# Patient Record
Sex: FEMALE | Race: WHITE | Employment: OTHER | URBAN - METROPOLITAN AREA
[De-identification: names, ages, dates, MRNs, and addresses within clinical notes are randomized per-mention and may not be internally consistent; named-entity substitution may affect disease eponyms.]

---

## 2020-12-22 ENCOUNTER — HOSPITAL ENCOUNTER (OUTPATIENT)
Dept: PREADMISSION TESTING | Age: 70
Discharge: HOME | End: 2020-12-22
Payer: MEDICARE

## 2020-12-24 ENCOUNTER — OFFICE VISIT (OUTPATIENT)
Dept: PRIMARY CARE CLINIC | Age: 70
End: 2020-12-24
Payer: MEDICARE

## 2020-12-24 PROCEDURE — G8421 BMI NOT CALCULATED: HCPCS | Performed by: NURSE PRACTITIONER

## 2020-12-24 PROCEDURE — G8428 CUR MEDS NOT DOCUMENT: HCPCS | Performed by: NURSE PRACTITIONER

## 2020-12-24 PROCEDURE — 99211 OFF/OP EST MAY X REQ PHY/QHP: CPT | Performed by: NURSE PRACTITIONER

## 2020-12-24 NOTE — PROGRESS NOTES
Patient presented to [facility] drive up clinic for preop testing. Patient was swabbed and given information advising them to remain isolated until procedure date.

## 2020-12-25 LAB — SARS-COV-2: DETECTED

## 2020-12-26 NOTE — RESULT ENCOUNTER NOTE
LM to contact PCP. Your test came back detected/positive for Covid-19. What happens if I have a positive test?  If you have symptoms:  Isolate until all three of these things are true: 1) your symptoms are better, 2) it has been 10 days since you first felt sick, and 3) you have had no fever for at least 24 hours without using medicine that lowers fever. Drink plenty of fluids and eat when you can. You may take medicine for pain or fever if you need to. Rest as much as you can. If you do not have symptoms:  Stay home for 10 days after the date you were tested. If you develop symptoms during those 10 days, stay home until all three of these things are true: 1) your symptoms are better, 2) it has been 10 days since you first felt sick, and 3) you have had no fever for at least 24 hours without using medicine that lowers fever. Follow care instructions from your doctor or other healthcare provider. Seek emergency medical care immediately if you have trouble breathing, persistent pain or pressure in the chest, new confusion, inability to wake or stay awake, or bluish lips or face. Someone from the health department (case investigator or contact tracer) may reach out to you to check on your health and ask about other people you have been around or where you've spent time while you may have been able to spread COVID-19 to others. This person's role is strictly to map the virus to help identify people who may have been exposed to the virus and prevent its spread. The local health department will also provide guidance on how to stay safely at home to avoid spreading illness. Detected results can happen for months and you are not considered contagious. No retest is necessary.

## 2020-12-28 ENCOUNTER — HOSPITAL ENCOUNTER (OUTPATIENT)
Dept: PREADMISSION TESTING | Age: 70
Discharge: HOME | End: 2021-01-01
Payer: MEDICARE

## 2021-01-20 ENCOUNTER — HOSPITAL ENCOUNTER (OUTPATIENT)
Dept: INTERVENTIONAL RADIOLOGY/VASCULAR | Age: 71
Discharge: HOME | End: 2021-01-20
Payer: MEDICARE

## 2021-03-10 ENCOUNTER — HOSPITAL ENCOUNTER (OUTPATIENT)
Dept: PREADMISSION TESTING | Age: 71
Discharge: HOME | End: 2021-03-14
Payer: MEDICARE

## 2021-03-10 DIAGNOSIS — Z01.818 ENCOUNTER FOR PREADMISSION TESTING: Primary | ICD-10-CM

## 2021-03-10 LAB
ABO/RH: NORMAL
ANION GAP SERPL CALCULATED.3IONS-SCNC: 8 MMOL/L (ref 3–16)
ANTIBODY SCREEN: NORMAL
APTT: 34.3 SEC (ref 24.2–36.2)
BUN BLDV-MCNC: 14 MG/DL (ref 7–20)
CALCIUM SERPL-MCNC: 9.4 MG/DL (ref 8.3–10.6)
CHLORIDE BLD-SCNC: 103 MMOL/L (ref 99–110)
CO2: 29 MMOL/L (ref 21–32)
CREAT SERPL-MCNC: 0.7 MG/DL (ref 0.6–1.2)
EKG ATRIAL RATE: 71 BPM
EKG DIAGNOSIS: NORMAL
EKG P AXIS: 66 DEGREES
EKG P-R INTERVAL: 144 MS
EKG Q-T INTERVAL: 360 MS
EKG QRS DURATION: 74 MS
EKG QTC CALCULATION (BAZETT): 391 MS
EKG R AXIS: 9 DEGREES
EKG T AXIS: 34 DEGREES
EKG VENTRICULAR RATE: 71 BPM
GFR AFRICAN AMERICAN: >60
GFR NON-AFRICAN AMERICAN: >60
GLUCOSE BLD-MCNC: 93 MG/DL (ref 70–99)
HCT VFR BLD CALC: 40.5 % (ref 36–48)
HEMOGLOBIN: 13.7 G/DL (ref 12–16)
INR BLD: 1.07 (ref 0.86–1.14)
MCH RBC QN AUTO: 30.1 PG (ref 26–34)
MCHC RBC AUTO-ENTMCNC: 33.9 G/DL (ref 31–36)
MCV RBC AUTO: 88.7 FL (ref 80–100)
PDW BLD-RTO: 14.2 % (ref 12.4–15.4)
PLATELET # BLD: 281 K/UL (ref 135–450)
PMV BLD AUTO: 7.6 FL (ref 5–10.5)
POTASSIUM SERPL-SCNC: 4.2 MMOL/L (ref 3.5–5.1)
PROTHROMBIN TIME: 12.4 SEC (ref 10–13.2)
RBC # BLD: 4.56 M/UL (ref 4–5.2)
SODIUM BLD-SCNC: 140 MMOL/L (ref 136–145)
WBC # BLD: 8.1 K/UL (ref 4–11)

## 2021-03-10 PROCEDURE — 86900 BLOOD TYPING SEROLOGIC ABO: CPT

## 2021-03-10 PROCEDURE — 86850 RBC ANTIBODY SCREEN: CPT

## 2021-03-10 PROCEDURE — 86901 BLOOD TYPING SEROLOGIC RH(D): CPT

## 2021-03-10 PROCEDURE — 93010 ELECTROCARDIOGRAM REPORT: CPT | Performed by: INTERNAL MEDICINE

## 2021-03-10 PROCEDURE — 85730 THROMBOPLASTIN TIME PARTIAL: CPT

## 2021-03-10 PROCEDURE — 93005 ELECTROCARDIOGRAM TRACING: CPT | Performed by: UROLOGY

## 2021-03-10 PROCEDURE — 85610 PROTHROMBIN TIME: CPT

## 2021-03-10 PROCEDURE — 80048 BASIC METABOLIC PNL TOTAL CA: CPT

## 2021-03-10 PROCEDURE — 85027 COMPLETE CBC AUTOMATED: CPT

## 2021-03-12 ENCOUNTER — OFFICE VISIT (OUTPATIENT)
Dept: PRIMARY CARE CLINIC | Age: 71
End: 2021-03-12
Payer: MEDICARE

## 2021-03-12 DIAGNOSIS — Z20.828 EXPOSURE TO SARS-ASSOCIATED CORONAVIRUS: Primary | ICD-10-CM

## 2021-03-12 LAB — SARS-COV-2: NOT DETECTED

## 2021-03-12 PROCEDURE — 99211 OFF/OP EST MAY X REQ PHY/QHP: CPT | Performed by: NURSE PRACTITIONER

## 2021-03-12 NOTE — PROGRESS NOTES
Wild Grossman received a viral test for COVID-19. They were educated on isolation and quarantine as appropriate. For any symptoms, they were directed to seek care from their PCP, given contact information to establish with a doctor, directed to an urgent care or the emergency room.

## 2021-03-15 RX ORDER — CHLORAL HYDRATE 500 MG
1 CAPSULE ORAL DAILY
Status: ON HOLD | COMMUNITY
End: 2021-03-18 | Stop reason: HOSPADM

## 2021-03-15 RX ORDER — VITAMIN E 268 MG
400 CAPSULE ORAL DAILY
Status: ON HOLD | COMMUNITY
End: 2021-03-18 | Stop reason: HOSPADM

## 2021-03-15 RX ORDER — MULTIVIT WITH MINERALS/LUTEIN
1000 TABLET ORAL DAILY
Status: ON HOLD | COMMUNITY
End: 2021-03-18 | Stop reason: HOSPADM

## 2021-03-15 SDOH — HEALTH STABILITY: MENTAL HEALTH: HOW OFTEN DO YOU HAVE A DRINK CONTAINING ALCOHOL?: NEVER

## 2021-03-15 NOTE — PROGRESS NOTES
[address] time__0630__________        Surgery time___0930_________    Take the following medications with a sip of water: Follow your MD/Surgeons pre-procedure instructions regarding your medications    Do not eat or drink anything after 12:00 midnight prior to your surgery. This includes water chewing gum, mints and ice chips. You may brush your teeth and gargle the morning of your surgery, but do not swallow the water     Please see your family doctor/pediatrician for a history and physical and/or concerning medications. Bring any test results/reports from your physicians office. If you are under the care of a heart doctor or specialist doctor, please be aware that you may be asked to them for clearance    You may be asked to stop blood thinners such as Coumadin, Plavix, Fragmin, Lovenox, etc., or any anti-inflammatories such as:  Aspirin, Ibuprofen, Advil, Naproxen prior to your surgery. We also ask that you stop any OTC medications such as fish oil, vitamin E, glucosamine, garlic, Multivitamins, COQ 10, etc. MAY TAKE TYLENOL    We ask that you do not smoke 24 hours prior to surgery  We ask that you do not  drink any alcoholic beverages 24 hours prior to surgery     You must make arrangements for a responsible adult to take you home after your surgery. For your safety you will not be allowed to leave alone or drive yourself home. Your surgery will be cancelled if you do not have a ride home. Also for your safety, it is strongly suggested that someone stay with you the first 24 hours after your surgery. A parent or legal guardian must accompany a child scheduled for surgery and plan to stay at the hospital until the child is discharged. Please do not bring other children with you. For your comfort, please wear simple loose fitting clothing to the hospital.  Please do not bring valuables.     Do not wear any make-up or nail polish on your fingers or toes      For your safety, please do not wear any jewelry or body piercing's on the day of surgery. All jewelry must be removed. If you have dentures, they will be removed before going to operating room. For your convenience, we will provide you with a container. If you wear contact lenses or glasses, they will be removed, please bring a case for them. If you have a living will and a durable power of  for healthcare, please bring in a copy. As part of our patient safety program to minimize surgical site infections, we ask you to do the following:    · Please notify your surgeon if you develop any illness between         now and the  day of your surgery. · This includes a cough, cold, fever, sore throat, nausea,         or vomiting, and diarrhea, etc.  ·  Please notify your surgeon if you experience dizziness, shortness         of breath or blurred vision between now and the time of your surgery. Do not shave your operative site 96 hours prior to surgery. For face and neck surgery, men may use an electric razor 48 hours   prior to surgery. You may shower the night before surgery or the morning of   your surgery with an antibacterial soap. You will need to bring a photo ID and insurance card    [facility] has an onsite pharmacy, would you like to utilize our pharmacy     If you will be staying overnight and use a C-pap machine, please bring   your C-pap to hospital     Our goal is to provide you with excellent care, therefore, visitors will be limited to two(2) in the room at a time so that we may focus on providing this care for you. Please contact pre-admission testing if you have any further questions.                  [facility] phone number:  [phone] PAT fax number:  [phone]  Please note these are generalized instructions for all surgical cases, you may be provided with more specific instructions according to your surgery. Remember. Laney Ramos Safety First! Call before you Fall Remember

## 2021-03-16 ENCOUNTER — ANESTHESIA EVENT (OUTPATIENT)
Dept: OPERATING ROOM | Age: 71
End: 2021-03-16
Payer: MEDICARE

## 2021-03-17 ENCOUNTER — HOSPITAL ENCOUNTER (OUTPATIENT)
Age: 71
Setting detail: OBSERVATION
Discharge: HOME | End: 2021-03-18
Attending: UROLOGY | Admitting: UROLOGY
Payer: MEDICARE

## 2021-03-17 ENCOUNTER — ANESTHESIA (OUTPATIENT)
Dept: OPERATING ROOM | Age: 71
End: 2021-03-17
Payer: MEDICARE

## 2021-03-17 ENCOUNTER — APPOINTMENT (OUTPATIENT)
Dept: GENERAL RADIOLOGY | Age: 71
End: 2021-03-17
Attending: UROLOGY
Payer: MEDICARE

## 2021-03-17 ENCOUNTER — HOSPITAL ENCOUNTER (OUTPATIENT)
Dept: INTERVENTIONAL RADIOLOGY/VASCULAR | Age: 71
Discharge: HOME | End: 2021-03-17
Payer: MEDICARE

## 2021-03-17 VITALS
DIASTOLIC BLOOD PRESSURE: 71 MMHG | TEMPERATURE: 95.5 F | SYSTOLIC BLOOD PRESSURE: 144 MMHG | RESPIRATION RATE: 16 BRPM | OXYGEN SATURATION: 100 %

## 2021-03-17 DIAGNOSIS — N20.0 CALCULUS OF KIDNEY: ICD-10-CM

## 2021-03-17 LAB
ABO/RH: NORMAL
ANTIBODY SCREEN: NORMAL

## 2021-03-17 PROCEDURE — C2617 STENT, NON-COR, TEM W/O DEL: HCPCS | Performed by: UROLOGY

## 2021-03-17 PROCEDURE — 6360000004 HC RX CONTRAST MEDICATION: Performed by: UROLOGY

## 2021-03-17 PROCEDURE — 3209999900 FLUORO FOR SURGICAL PROCEDURES

## 2021-03-17 PROCEDURE — 2580000003 HC RX 258: Performed by: UROLOGY

## 2021-03-17 PROCEDURE — 3700000001 HC ADD 15 MINUTES (ANESTHESIA): Performed by: UROLOGY

## 2021-03-17 PROCEDURE — 6370000000 HC RX 637 (ALT 250 FOR IP): Performed by: UROLOGY

## 2021-03-17 PROCEDURE — 99153 MOD SED SAME PHYS/QHP EA: CPT

## 2021-03-17 PROCEDURE — 96365 THER/PROPH/DIAG IV INF INIT: CPT

## 2021-03-17 PROCEDURE — 2720000010 HC SURG SUPPLY STERILE: Performed by: UROLOGY

## 2021-03-17 PROCEDURE — 2709999900 HC NON-CHARGEABLE SUPPLY: Performed by: UROLOGY

## 2021-03-17 PROCEDURE — 82365 CALCULUS SPECTROSCOPY: CPT

## 2021-03-17 PROCEDURE — 2500000003 HC RX 250 WO HCPCS

## 2021-03-17 PROCEDURE — C1769 GUIDE WIRE: HCPCS | Performed by: UROLOGY

## 2021-03-17 PROCEDURE — 1200000000 HC SEMI PRIVATE

## 2021-03-17 PROCEDURE — 53899 UNLISTED PX URINARY SYSTEM: CPT

## 2021-03-17 PROCEDURE — C1726 CATH, BAL DIL, NON-VASCULAR: HCPCS | Performed by: UROLOGY

## 2021-03-17 PROCEDURE — C1894 INTRO/SHEATH, NON-LASER: HCPCS | Performed by: UROLOGY

## 2021-03-17 PROCEDURE — 86900 BLOOD TYPING SEROLOGIC ABO: CPT

## 2021-03-17 PROCEDURE — 50433 PLMT NEPHROURETERAL CATHETER: CPT

## 2021-03-17 PROCEDURE — 2580000003 HC RX 258: Performed by: ANESTHESIOLOGY

## 2021-03-17 PROCEDURE — 6360000002 HC RX W HCPCS

## 2021-03-17 PROCEDURE — 94640 AIRWAY INHALATION TREATMENT: CPT

## 2021-03-17 PROCEDURE — 74018 RADEX ABDOMEN 1 VIEW: CPT

## 2021-03-17 PROCEDURE — 88300 SURGICAL PATH GROSS: CPT

## 2021-03-17 PROCEDURE — G0378 HOSPITAL OBSERVATION PER HR: HCPCS

## 2021-03-17 PROCEDURE — 99152 MOD SED SAME PHYS/QHP 5/>YRS: CPT

## 2021-03-17 PROCEDURE — 3700000000 HC ANESTHESIA ATTENDED CARE: Performed by: UROLOGY

## 2021-03-17 PROCEDURE — 2709999900 IR GUIDED URETERAL STENT PLACE WO NEPHRO CATH NEW ACCESS

## 2021-03-17 PROCEDURE — 6360000002 HC RX W HCPCS: Performed by: UROLOGY

## 2021-03-17 PROCEDURE — 7100000000 HC PACU RECOVERY - FIRST 15 MIN: Performed by: UROLOGY

## 2021-03-17 PROCEDURE — 3600000014 HC SURGERY LEVEL 4 ADDTL 15MIN: Performed by: UROLOGY

## 2021-03-17 PROCEDURE — 3600000004 HC SURGERY LEVEL 4 BASE: Performed by: UROLOGY

## 2021-03-17 PROCEDURE — 86901 BLOOD TYPING SEROLOGIC RH(D): CPT

## 2021-03-17 PROCEDURE — 86850 RBC ANTIBODY SCREEN: CPT

## 2021-03-17 PROCEDURE — 7100000001 HC PACU RECOVERY - ADDTL 15 MIN: Performed by: UROLOGY

## 2021-03-17 PROCEDURE — 6360000002 HC RX W HCPCS: Performed by: RADIOLOGY

## 2021-03-17 DEVICE — URETERAL STENT
Type: IMPLANTABLE DEVICE | Site: KIDNEY | Status: FUNCTIONAL
Brand: CONTOUR™

## 2021-03-17 RX ORDER — MAGNESIUM HYDROXIDE 1200 MG/15ML
LIQUID ORAL CONTINUOUS PRN
Status: COMPLETED | OUTPATIENT
Start: 2021-03-17 | End: 2021-03-17

## 2021-03-17 RX ORDER — KETAMINE HCL IN NACL, ISO-OSM 100MG/10ML
SYRINGE (ML) INJECTION PRN
Status: DISCONTINUED | OUTPATIENT
Start: 2021-03-17 | End: 2021-03-17 | Stop reason: SDUPTHER

## 2021-03-17 RX ORDER — OXYCODONE HYDROCHLORIDE 5 MG/1
5 TABLET ORAL EVERY 4 HOURS PRN
Status: DISCONTINUED | OUTPATIENT
Start: 2021-03-17 | End: 2021-03-18 | Stop reason: HOSPADM

## 2021-03-17 RX ORDER — DIPHENHYDRAMINE HYDROCHLORIDE 50 MG/ML
INJECTION INTRAMUSCULAR; INTRAVENOUS DAILY PRN
Status: COMPLETED | OUTPATIENT
Start: 2021-03-17 | End: 2021-03-17

## 2021-03-17 RX ORDER — ONDANSETRON 4 MG/1
4 TABLET, ORALLY DISINTEGRATING ORAL EVERY 8 HOURS PRN
Status: DISCONTINUED | OUTPATIENT
Start: 2021-03-17 | End: 2021-03-18 | Stop reason: HOSPADM

## 2021-03-17 RX ORDER — SODIUM CHLORIDE 0.9 % (FLUSH) 0.9 %
10 SYRINGE (ML) INJECTION EVERY 12 HOURS SCHEDULED
Status: DISCONTINUED | OUTPATIENT
Start: 2021-03-17 | End: 2021-03-17 | Stop reason: HOSPADM

## 2021-03-17 RX ORDER — LABETALOL HYDROCHLORIDE 5 MG/ML
5 INJECTION, SOLUTION INTRAVENOUS EVERY 10 MIN PRN
Status: DISCONTINUED | OUTPATIENT
Start: 2021-03-17 | End: 2021-03-17 | Stop reason: HOSPADM

## 2021-03-17 RX ORDER — ROCURONIUM BROMIDE 10 MG/ML
INJECTION, SOLUTION INTRAVENOUS PRN
Status: DISCONTINUED | OUTPATIENT
Start: 2021-03-17 | End: 2021-03-17 | Stop reason: SDUPTHER

## 2021-03-17 RX ORDER — PROMETHAZINE HYDROCHLORIDE 25 MG/ML
6.25 INJECTION, SOLUTION INTRAMUSCULAR; INTRAVENOUS
Status: DISCONTINUED | OUTPATIENT
Start: 2021-03-17 | End: 2021-03-17 | Stop reason: HOSPADM

## 2021-03-17 RX ORDER — MIDAZOLAM HYDROCHLORIDE 1 MG/ML
INJECTION INTRAMUSCULAR; INTRAVENOUS DAILY PRN
Status: COMPLETED | OUTPATIENT
Start: 2021-03-17 | End: 2021-03-17

## 2021-03-17 RX ORDER — FENTANYL CITRATE 50 UG/ML
INJECTION, SOLUTION INTRAMUSCULAR; INTRAVENOUS PRN
Status: DISCONTINUED | OUTPATIENT
Start: 2021-03-17 | End: 2021-03-17 | Stop reason: SDUPTHER

## 2021-03-17 RX ORDER — SODIUM CHLORIDE 0.9 % (FLUSH) 0.9 %
10 SYRINGE (ML) INJECTION PRN
Status: DISCONTINUED | OUTPATIENT
Start: 2021-03-17 | End: 2021-03-17 | Stop reason: HOSPADM

## 2021-03-17 RX ORDER — SODIUM CHLORIDE 0.9 % (FLUSH) 0.9 %
10 SYRINGE (ML) INJECTION PRN
Status: DISCONTINUED | OUTPATIENT
Start: 2021-03-17 | End: 2021-03-18 | Stop reason: HOSPADM

## 2021-03-17 RX ORDER — FENTANYL CITRATE 50 UG/ML
INJECTION, SOLUTION INTRAMUSCULAR; INTRAVENOUS DAILY PRN
Status: COMPLETED | OUTPATIENT
Start: 2021-03-17 | End: 2021-03-17

## 2021-03-17 RX ORDER — ONDANSETRON 2 MG/ML
4 INJECTION INTRAMUSCULAR; INTRAVENOUS EVERY 6 HOURS PRN
Status: DISCONTINUED | OUTPATIENT
Start: 2021-03-17 | End: 2021-03-18 | Stop reason: HOSPADM

## 2021-03-17 RX ORDER — PROPOFOL 10 MG/ML
INJECTION, EMULSION INTRAVENOUS PRN
Status: DISCONTINUED | OUTPATIENT
Start: 2021-03-17 | End: 2021-03-17 | Stop reason: SDUPTHER

## 2021-03-17 RX ORDER — SODIUM CHLORIDE 9 MG/ML
INJECTION, SOLUTION INTRAVENOUS CONTINUOUS
Status: DISCONTINUED | OUTPATIENT
Start: 2021-03-17 | End: 2021-03-18 | Stop reason: HOSPADM

## 2021-03-17 RX ORDER — LIDOCAINE HYDROCHLORIDE 20 MG/ML
INJECTION, SOLUTION EPIDURAL; INFILTRATION; INTRACAUDAL; PERINEURAL PRN
Status: DISCONTINUED | OUTPATIENT
Start: 2021-03-17 | End: 2021-03-17 | Stop reason: SDUPTHER

## 2021-03-17 RX ORDER — GLYCOPYRROLATE 0.2 MG/ML
INJECTION INTRAMUSCULAR; INTRAVENOUS PRN
Status: DISCONTINUED | OUTPATIENT
Start: 2021-03-17 | End: 2021-03-17 | Stop reason: SDUPTHER

## 2021-03-17 RX ORDER — PHENYLEPHRINE HCL IN 0.9% NACL 1 MG/10 ML
SYRINGE (ML) INTRAVENOUS PRN
Status: DISCONTINUED | OUTPATIENT
Start: 2021-03-17 | End: 2021-03-17 | Stop reason: SDUPTHER

## 2021-03-17 RX ORDER — SODIUM CHLORIDE 9 MG/ML
INJECTION, SOLUTION INTRAVENOUS CONTINUOUS
Status: DISCONTINUED | OUTPATIENT
Start: 2021-03-17 | End: 2021-03-17

## 2021-03-17 RX ORDER — SODIUM CHLORIDE 0.9 % (FLUSH) 0.9 %
10 SYRINGE (ML) INJECTION EVERY 12 HOURS SCHEDULED
Status: DISCONTINUED | OUTPATIENT
Start: 2021-03-17 | End: 2021-03-18 | Stop reason: HOSPADM

## 2021-03-17 RX ORDER — FENTANYL CITRATE 50 UG/ML
25 INJECTION, SOLUTION INTRAMUSCULAR; INTRAVENOUS EVERY 5 MIN PRN
Status: DISCONTINUED | OUTPATIENT
Start: 2021-03-17 | End: 2021-03-17 | Stop reason: HOSPADM

## 2021-03-17 RX ORDER — BUDESONIDE AND FORMOTEROL FUMARATE DIHYDRATE 80; 4.5 UG/1; UG/1
2 AEROSOL RESPIRATORY (INHALATION) 2 TIMES DAILY
Status: DISCONTINUED | OUTPATIENT
Start: 2021-03-17 | End: 2021-03-18 | Stop reason: HOSPADM

## 2021-03-17 RX ORDER — DEXAMETHASONE SODIUM PHOSPHATE 4 MG/ML
INJECTION, SOLUTION INTRA-ARTICULAR; INTRALESIONAL; INTRAMUSCULAR; INTRAVENOUS; SOFT TISSUE PRN
Status: DISCONTINUED | OUTPATIENT
Start: 2021-03-17 | End: 2021-03-17 | Stop reason: SDUPTHER

## 2021-03-17 RX ADMIN — Medication 10 MG: at 10:10

## 2021-03-17 RX ADMIN — SUGAMMADEX 100 MG: 100 INJECTION, SOLUTION INTRAVENOUS at 10:57

## 2021-03-17 RX ADMIN — SODIUM CHLORIDE: 9 INJECTION, SOLUTION INTRAVENOUS at 10:51

## 2021-03-17 RX ADMIN — CEFAZOLIN SODIUM 2000 MG: 10 INJECTION, POWDER, FOR SOLUTION INTRAVENOUS at 15:24

## 2021-03-17 RX ADMIN — ROCURONIUM BROMIDE 40 MG: 10 INJECTION INTRAVENOUS at 09:52

## 2021-03-17 RX ADMIN — CEFTRIAXONE 1000 MG: 1 INJECTION, POWDER, FOR SOLUTION INTRAMUSCULAR; INTRAVENOUS at 07:48

## 2021-03-17 RX ADMIN — SODIUM CHLORIDE, PRESERVATIVE FREE 10 ML: 5 INJECTION INTRAVENOUS at 20:14

## 2021-03-17 RX ADMIN — ROCURONIUM BROMIDE 10 MG: 10 INJECTION INTRAVENOUS at 09:51

## 2021-03-17 RX ADMIN — FENTANYL CITRATE 50 MCG: 50 INJECTION INTRAMUSCULAR; INTRAVENOUS at 08:29

## 2021-03-17 RX ADMIN — SODIUM CHLORIDE: 9 INJECTION, SOLUTION INTRAVENOUS at 13:08

## 2021-03-17 RX ADMIN — LIDOCAINE HYDROCHLORIDE 100 MG: 20 INJECTION, SOLUTION EPIDURAL; INFILTRATION; INTRACAUDAL; PERINEURAL at 09:51

## 2021-03-17 RX ADMIN — PROPOFOL 200 MG: 10 INJECTION, EMULSION INTRAVENOUS at 09:51

## 2021-03-17 RX ADMIN — Medication 100 MCG: at 11:03

## 2021-03-17 RX ADMIN — Medication 100 MCG: at 10:04

## 2021-03-17 RX ADMIN — GLYCOPYRROLATE 0.2 MG: 0.2 INJECTION, SOLUTION INTRAMUSCULAR; INTRAVENOUS at 09:45

## 2021-03-17 RX ADMIN — IOPAMIDOL 15 ML: 612 INJECTION, SOLUTION INTRATHECAL at 09:05

## 2021-03-17 RX ADMIN — SODIUM CHLORIDE: 9 INJECTION, SOLUTION INTRAVENOUS at 07:17

## 2021-03-17 RX ADMIN — SODIUM CHLORIDE: 9 INJECTION, SOLUTION INTRAVENOUS at 20:12

## 2021-03-17 RX ADMIN — Medication 2 PUFF: at 22:08

## 2021-03-17 RX ADMIN — DEXAMETHASONE SODIUM PHOSPHATE 10 MG: 4 INJECTION, SOLUTION INTRAMUSCULAR; INTRAVENOUS at 10:00

## 2021-03-17 RX ADMIN — FENTANYL CITRATE 50 MCG: 50 INJECTION INTRAMUSCULAR; INTRAVENOUS at 09:50

## 2021-03-17 RX ADMIN — HYDROMORPHONE HYDROCHLORIDE 0.5 MG: 1 INJECTION, SOLUTION INTRAMUSCULAR; INTRAVENOUS; SUBCUTANEOUS at 20:07

## 2021-03-17 RX ADMIN — MIDAZOLAM 1 MG: 1 INJECTION INTRAMUSCULAR; INTRAVENOUS at 08:12

## 2021-03-17 RX ADMIN — FENTANYL CITRATE 50 MCG: 50 INJECTION INTRAMUSCULAR; INTRAVENOUS at 08:12

## 2021-03-17 RX ADMIN — DIPHENHYDRAMINE HYDROCHLORIDE 25 MG: 50 INJECTION, SOLUTION INTRAMUSCULAR; INTRAVENOUS at 08:27

## 2021-03-17 RX ADMIN — MIDAZOLAM 1 MG: 1 INJECTION INTRAMUSCULAR; INTRAVENOUS at 08:30

## 2021-03-17 RX ADMIN — Medication 10 MG: at 10:46

## 2021-03-17 RX ADMIN — Medication 50 MCG: at 10:03

## 2021-03-17 RX ADMIN — SUGAMMADEX 100 MG: 100 INJECTION, SOLUTION INTRAVENOUS at 11:20

## 2021-03-17 RX ADMIN — DIPHENHYDRAMINE HYDROCHLORIDE 25 MG: 50 INJECTION, SOLUTION INTRAMUSCULAR; INTRAVENOUS at 08:08

## 2021-03-17 RX ADMIN — Medication 10 MG: at 10:34

## 2021-03-17 ASSESSMENT — PULMONARY FUNCTION TESTS
PIF_VALUE: 19
PIF_VALUE: 18
PIF_VALUE: 20
PIF_VALUE: 1
PIF_VALUE: 4
PIF_VALUE: 18
PIF_VALUE: 2
PIF_VALUE: 0
PIF_VALUE: 2
PIF_VALUE: 18
PIF_VALUE: 19
PIF_VALUE: 18
PIF_VALUE: 18
PIF_VALUE: 11
PIF_VALUE: 19
PIF_VALUE: 6
PIF_VALUE: 18
PIF_VALUE: 17
PIF_VALUE: 3
PIF_VALUE: 19
PIF_VALUE: 18
PIF_VALUE: 18
PIF_VALUE: 0
PIF_VALUE: 19
PIF_VALUE: 18
PIF_VALUE: 17
PIF_VALUE: 18
PIF_VALUE: 17
PIF_VALUE: 20
PIF_VALUE: 17
PIF_VALUE: 17
PIF_VALUE: 16
PIF_VALUE: 17
PIF_VALUE: 18
PIF_VALUE: 18
PIF_VALUE: 19
PIF_VALUE: 6
PIF_VALUE: 18
PIF_VALUE: 17
PIF_VALUE: 6
PIF_VALUE: 17
PIF_VALUE: 5
PIF_VALUE: 18
PIF_VALUE: 22
PIF_VALUE: 19
PIF_VALUE: 19
PIF_VALUE: 18
PIF_VALUE: 19
PIF_VALUE: 17
PIF_VALUE: 19
PIF_VALUE: 17
PIF_VALUE: 0
PIF_VALUE: 18
PIF_VALUE: 18
PIF_VALUE: 19
PIF_VALUE: 17
PIF_VALUE: 18

## 2021-03-17 ASSESSMENT — PAIN SCALES - GENERAL
PAINLEVEL_OUTOF10: 0
PAINLEVEL_OUTOF10: 3
PAINLEVEL_OUTOF10: 7

## 2021-03-17 ASSESSMENT — PAIN - FUNCTIONAL ASSESSMENT
PAIN_FUNCTIONAL_ASSESSMENT: PREVENTS OR INTERFERES SOME ACTIVE ACTIVITIES AND ADLS
PAIN_FUNCTIONAL_ASSESSMENT: 0-10

## 2021-03-17 ASSESSMENT — PAIN DESCRIPTION - LOCATION: LOCATION: FLANK

## 2021-03-17 ASSESSMENT — PAIN DESCRIPTION - ONSET: ONSET: ON-GOING

## 2021-03-17 NOTE — PRE SEDATION
Sedation Pre-Procedure Note    Patient Name: Mary Garcias   YOB: 1950  Room/Bed: Room/bed info not found  Medical Record Number: [id number]  Date: 3/17/2021   Time: 7:52 AM       Indication:  Right perc neph prior to nephrolithotomy    Consent: I have discussed with the patient and/or the patient representative the indication, alternatives, and the possible risks and/or complications of the planned procedure and the anesthesia methods. The patient and/or patient representative appear to understand and agree to proceed. Vital Signs: There were no vitals filed for this visit. Past Medical History:   has a past medical history of Asthma and Kidney stones. Past Surgical History:   has a past surgical history that includes joint replacement (Left); Femur Surgery (Right); Elbow fracture surgery (Right); fracture surgery; and Hardware Removal.    Medications:   Scheduled Meds:   Continuous Infusions:   PRN Meds:   Home Meds:   Prior to Admission medications    Medication Sig Start Date End Date Taking? Authorizing Provider   fluticasone-salmeterol (ADVAIR) 100-50 MCG/DOSE diskus inhaler Inhale 1 puff into the lungs 2 times daily 1/5/21   Historical Provider, MD   VITAMIN D, CHOLECALCIFEROL, PO Take 15,000 Units by mouth daily    Historical Provider, MD   Ascorbic Acid (VITAMIN C) 1000 MG tablet Take 1,000 mg by mouth daily    Historical Provider, MD   [place]-3 1000 MG CAPS Take 1 capsule by mouth daily    Historical Provider, MD   vitamin E 400 UNIT capsule Take 400 Units by mouth daily    Historical Provider, MD     Coumadin Use Last 7 Days:  no  Antiplatelet drug therapy use last 7 days: no  Other anticoagulant use last 7 days: no  Additional Medication Information:  na      Pre-Sedation Documentation and Exam:   I have reviewed the patient's history and review of systems.     Mallampati Airway Assessment:  Mallampati Class II - (soft palate, fauces & uvula are visible)    Prior History of Anesthesia Complications:   none    ASA Classification:  Class 2 - A normal healthy patient with mild systemic disease    Sedation/ Anesthesia Plan:   intravenous sedation    Medications Planned:   midazolam (Versed) intravenously and fentanyl intravenously    Patient is an appropriate candidate for plan of sedation: yes    Electronically signed by Fior Doran MD on 3/17/2021 at 7:52 AM

## 2021-03-17 NOTE — PROGRESS NOTES
4 Eyes Skin Assessment     NAME:  Bryson King  YOB: 1950  MEDICAL RECORD NUMBER:  [id number]    The patient is being assess for  Admission    I agree that 2 RN's have performed a thorough Head to Toe Skin Assessment on the patient. ALL assessment sites listed below have been assessed. Areas assessed by both nurses:    Head, Face, Ears, Shoulders, Back, Chest, Arms, Elbows, Hands, Sacrum. Buttock, Coccyx, Ischium and Legs. Feet and Heels        Does the Patient have a Wound? Yes wound(s) were present on assessment.  LDA wound assessment was Initiated and completed        Todd Prevention initiated:  No   Wound Care Orders initiated:  No    Pressure Injury (Stage 3,4, Unstageable, DTI, NWPT, and Complex wounds) if present place consult order under [de-identified] No    New and Established Ostomies if present place consult order under : No      Nurse 1 eSignature: Electronically signed by Jenaro Segundo RN on 3/17/21 at 1:26 PM EDT    **SHARE this note so that the co-signing nurse is able to place an eSignature**    Nurse 2 eSignature: Electronically signed by Neo Smith RN on 3/17/21 at 1:27 PM EDT

## 2021-03-17 NOTE — BRIEF OP NOTE
Brief Operative Note      Patient: Nereida Holbrook MRN: [id number]     YOB: 1950  Age: 65 y.o. Sex: female        DATE OF PROCEDURE: 3/17/2021     PROCEDURE PERFORMED: Right perc neph    SURGEON: Rajeev Palacio MD    ANESTHESIA: Fentanyl, Versed    Estimated Blood Loss:none    Complications: None. Device implanted: Access catheter through upper pole to urinary bladder.            Specimen: none    Electronically signed by Rajeev Palacio MD on 3/17/2021 at 9:07 AM

## 2021-03-17 NOTE — H&P
Date of Surgery Update:  Giulia Rueda was seen, history and physical examination reviewed, and the patient was re-evalutate by me today. There have been no significant clinical changes since the completion of the previous history and physical. The surgical site was confirmed by the patient and me. The risk, benefits, and alternatives of the proposed procedure have been explained to the patient (or appropriate guardian) and understanding verbalized. All questions answered. Patient wishes to proceed.     Electronically signed by: Shauna Workman DO,3/17/2021,7:50 AM

## 2021-03-17 NOTE — PROGRESS NOTES
Back to room. CAll light within reach. No needs at this time. Patient states no one here with her currently.

## 2021-03-17 NOTE — ANESTHESIA POSTPROCEDURE EVALUATION
Department of Anesthesiology  Postprocedure Note    Patient: Manuela Wick  MRN: [id number]  YOB: 1950  Date of evaluation: 3/17/2021  Time:  2:29 PM     Procedure Summary     Date: 03/17/21 Room / Location: [address]    Anesthesia Start: 9848 Anesthesia Stop: 6995    Procedure: RIGHT PERCUTANEOUS NEPHROLITHOTOMY, CYSTOSCOPY (Right Flank) Diagnosis:       Calculus of kidney      (CALCULUS RIGHT KIDNEY)    Surgeons: Tico Chowdhury DO Responsible Provider: Bin Manzo MD    Anesthesia Type: general ASA Status: 3          Anesthesia Type: general    Alexander Phase I: Alexander Score: 9    Alexander Phase II:      Last vitals: Reviewed and per EMR flowsheets.        Anesthesia Post Evaluation    Patient location during evaluation: bedside  Patient participation: complete - patient participated  Level of consciousness: awake  Pain score: 2  Airway patency: patent  Nausea & Vomiting: no nausea and no vomiting  Complications: no  Cardiovascular status: blood pressure returned to baseline  Respiratory status: acceptable  Hydration status: euvolemic

## 2021-03-17 NOTE — ANESTHESIA PRE PROCEDURE
[facility] Department of Anesthesiology  Pre-Anesthesia Evaluation/Consultation       Name:  Wiliam Mueller  : 1950  Age:  65 y.o. MRN:  [id number]  Date: 3/17/2021           Surgeon: Surgeon(s): Deena N Eder Moncada DO    Procedure: Procedure(s):  RIGHT PERCUTANEOUS NEPHROLITHOTOMY     No Known Allergies  There is no problem list on file for this patient. Past Medical History:   Diagnosis Date    Asthma     Kidney stones      Past Surgical History:   Procedure Laterality Date    ELBOW FRACTURE SURGERY Right     WITH LEFT ANKLE FRACTURE SURGERY     FEMUR SURGERY Right     FRACTURE    FRACTURE SURGERY      RIGHT ELBOW AND LEFT ANKLE    HARDWARE REMOVAL      SCREW REMOVAL FROM FEMUR    JOINT REPLACEMENT Left      Social History     Tobacco Use    Smoking status: Never Smoker    Smokeless tobacco: Never Used   Substance Use Topics    Alcohol use: Not Currently     Frequency: Never    Drug use: Never     Medications  No current facility-administered medications on file prior to encounter.       Current Outpatient Medications on File Prior to Encounter   Medication Sig Dispense Refill    fluticasone-salmeterol (ADVAIR) 100-50 MCG/DOSE diskus inhaler Inhale 1 puff into the lungs 2 times daily      VITAMIN D, CHOLECALCIFEROL, PO Take 15,000 Units by mouth daily      Ascorbic Acid (VITAMIN C) 1000 MG tablet Take 1,000 mg by mouth daily      Omega-3 1000 MG CAPS Take 1 capsule by mouth daily      vitamin E 400 UNIT capsule Take 400 Units by mouth daily       Current Facility-Administered Medications   Medication Dose Route Frequency Provider Last Rate Last Admin    cefTRIAXone (ROCEPHIN) 1000 mg IVPB in 50 mL D5W minibag  1,000 mg Intravenous Once [address] Moncada DO        0.9 % sodium chloride infusion   Intravenous Continuous Carlos Eduardo Llamas  mL/hr at 21 0717 New Bag at 21 0717    sodium chloride flush 0.9 % injection 10 mL  10 mL Intravenous 2 times per day Mark Jang MD        sodium chloride flush 0.9 % injection 10 mL  10 mL Intravenous PRN Mark Jang MD         Vital Signs (Current)   Vitals:    21   BP: (!) 161/73   Pulse: 73   Resp: 17   Temp: 98.4 °F (36.9 °C)   SpO2: 96%     Vital Signs Statistics (for past 48 hrs)     Temp  Av.4 °F (36.9 °C)  Min: 98.4 °F (36.9 °C)   Min taken time: 21  Max: 98.4 °F (36.9 °C)   Max taken time: 21  Pulse  Av  Min: 68   Min taken time: 21  Max: 68   Max taken time: 21  Resp  Av  Min: 16   Min taken time: 21  Max: 16   Max taken time: 21  BP  Min: 161/73   Min taken time: 21  Max: 161/73   Max taken time: 21  SpO2  Av %  Min: 96 %   Min taken time: 21  Max: 96 %   Max taken time: 21    BP Readings from Last 3 Encounters:   21 (!) 161/73     BMI  Body mass index is 26.57 kg/m². Estimated body mass index is 26.57 kg/m² as calculated from the following:    Height as of this encounter: 5' 3\" (1.6 m). Weight as of this encounter: 150 lb (68 kg).     CBC   Lab Results   Component Value Date    WBC 8.1 03/10/2021    RBC 4.56 03/10/2021    HGB 13.7 03/10/2021    HCT 40.5 03/10/2021    MCV 88.7 03/10/2021    RDW 14.2 03/10/2021     03/10/2021     CMP    Lab Results   Component Value Date     03/10/2021    K 4.2 03/10/2021     03/10/2021    CO2 29 03/10/2021    BUN 14 03/10/2021    CREATININE 0.7 03/10/2021    GFRAA >60 03/10/2021    LABGLOM >60 03/10/2021    GLUCOSE 93 03/10/2021    CALCIUM 9.4 03/10/2021     BMP    Lab Results   Component Value Date     03/10/2021    K 4.2 03/10/2021     03/10/2021    CO2 29 03/10/2021    BUN 14 03/10/2021    CREATININE 0.7 03/10/2021    CALCIUM 9.4 03/10/2021    GFRAA >60 03/10/2021    LABGLOM >60 03/10/2021    GLUCOSE 93 03/10/2021     POCGlucose  No results for input(s): GLUCOSE in the last 72 hours. [facility]    Lab Results   Component Value Date    PROTIME 12.4 03/10/2021    INR 1.07 03/10/2021    APTT 34.3 67/77/7935     HCG (If Applicable) No results found for: PREGTESTUR, PREGSERUM, HCG, HCGQUANT   ABGs No results found for: PHART, PO2ART, NMD7YOH, HCY8FYR, BEART, V6BHADMI   Type & Screen (If Applicable)  No results found for: LABABO, LABRH                         BMI: Wt Readings from Last 3 Encounters:       NPO Status:   Date of last liquid consumption: 03/16/21   Time of last liquid consumption: 2230   Date of last solid food consumption: 03/16/21      Time of last solid consumption: 2200       Anesthesia Evaluation  Patient summary reviewed no history of anesthetic complications:   Airway: Mallampati: III  TM distance: >3 FB   Neck ROM: full   Dental:    (+) partials      Pulmonary:normal exam    (+) COPD:  asthma:                            Cardiovascular:  Exercise tolerance: good (>4 METS),         ECG reviewed  Rhythm: regular  Rate: normal           Beta Blocker:  Not on Beta Blocker         Neuro/Psych:   Negative Neuro/Psych ROS              GI/Hepatic/Renal:   (+) renal disease: kidney stones,           Endo/Other: Negative Endo/Other ROS                    Abdominal:           Vascular: negative vascular ROS. Anesthesia Plan      general     ASA 3       Induction: intravenous. MIPS: Postoperative opioids intended and Prophylactic antiemetics administered. Anesthetic plan and risks discussed with patient. Use of blood products discussed with patient whom consented to blood products. Plan discussed with CRNA. This pre-anesthesia assessment may be used as a history and physical.    DOS STAFF ADDENDUM:    Pt seen and examined, chart reviewed (including anesthesia, drug and allergy history). No interval changes to history and physical examination.   Anesthetic plan, risks, benefits, alternatives, and personnel involved discussed with patient. Questions and concerns addressed. Patient(family) verbalized an understanding and agrees to proceed.       Gomez Vega MD  March 17, 2021  7:36 AM

## 2021-03-17 NOTE — PROGRESS NOTES
Awake, assists with reposition, tolerable discomfort with turn, denies pain at rest, respirations easy

## 2021-03-18 VITALS
HEIGHT: 63 IN | SYSTOLIC BLOOD PRESSURE: 144 MMHG | WEIGHT: 162.7 LBS | RESPIRATION RATE: 18 BRPM | DIASTOLIC BLOOD PRESSURE: 80 MMHG | TEMPERATURE: 98.1 F | BODY MASS INDEX: 28.83 KG/M2 | OXYGEN SATURATION: 94 % | HEART RATE: 76 BPM

## 2021-03-18 LAB
ANION GAP SERPL CALCULATED.3IONS-SCNC: 10 MMOL/L (ref 3–16)
BUN BLDV-MCNC: 11 MG/DL (ref 7–20)
CALCIUM SERPL-MCNC: 8.6 MG/DL (ref 8.3–10.6)
CHLORIDE BLD-SCNC: 105 MMOL/L (ref 99–110)
CO2: 23 MMOL/L (ref 21–32)
CREAT SERPL-MCNC: 0.7 MG/DL (ref 0.6–1.2)
GFR AFRICAN AMERICAN: >60
GFR NON-AFRICAN AMERICAN: >60
GLUCOSE BLD-MCNC: 101 MG/DL (ref 70–99)
HCT VFR BLD CALC: 37.3 % (ref 36–48)
HEMOGLOBIN: 12.4 G/DL (ref 12–16)
MCH RBC QN AUTO: 29.8 PG (ref 26–34)
MCHC RBC AUTO-ENTMCNC: 33.3 G/DL (ref 31–36)
MCV RBC AUTO: 89.6 FL (ref 80–100)
PDW BLD-RTO: 14.3 % (ref 12.4–15.4)
PLATELET # BLD: 258 K/UL (ref 135–450)
PMV BLD AUTO: 7.7 FL (ref 5–10.5)
POTASSIUM SERPL-SCNC: 4.1 MMOL/L (ref 3.5–5.1)
RBC # BLD: 4.16 M/UL (ref 4–5.2)
SODIUM BLD-SCNC: 138 MMOL/L (ref 136–145)
WBC # BLD: 16.1 K/UL (ref 4–11)

## 2021-03-18 PROCEDURE — G0378 HOSPITAL OBSERVATION PER HR: HCPCS

## 2021-03-18 PROCEDURE — 80048 BASIC METABOLIC PNL TOTAL CA: CPT

## 2021-03-18 PROCEDURE — 36415 COLL VENOUS BLD VENIPUNCTURE: CPT

## 2021-03-18 PROCEDURE — 6360000002 HC RX W HCPCS: Performed by: UROLOGY

## 2021-03-18 PROCEDURE — 2580000003 HC RX 258: Performed by: UROLOGY

## 2021-03-18 PROCEDURE — 85027 COMPLETE CBC AUTOMATED: CPT

## 2021-03-18 PROCEDURE — 94761 N-INVAS EAR/PLS OXIMETRY MLT: CPT

## 2021-03-18 PROCEDURE — 96375 TX/PRO/DX INJ NEW DRUG ADDON: CPT

## 2021-03-18 PROCEDURE — 96366 THER/PROPH/DIAG IV INF ADDON: CPT

## 2021-03-18 PROCEDURE — 6370000000 HC RX 637 (ALT 250 FOR IP): Performed by: UROLOGY

## 2021-03-18 RX ORDER — HYDROCODONE BITARTRATE AND ACETAMINOPHEN 5; 325 MG/1; MG/1
1 TABLET ORAL EVERY 6 HOURS PRN
Qty: 10 TABLET | Refills: 0 | Status: SHIPPED | OUTPATIENT
Start: 2021-03-18 | End: 2021-03-23

## 2021-03-18 RX ADMIN — OXYCODONE 5 MG: 5 TABLET ORAL at 11:45

## 2021-03-18 RX ADMIN — CEFAZOLIN SODIUM 2000 MG: 10 INJECTION, POWDER, FOR SOLUTION INTRAVENOUS at 01:00

## 2021-03-18 RX ADMIN — SODIUM CHLORIDE: 9 INJECTION, SOLUTION INTRAVENOUS at 05:05

## 2021-03-18 ASSESSMENT — PAIN DESCRIPTION - PROGRESSION
CLINICAL_PROGRESSION: NOT CHANGED

## 2021-03-18 ASSESSMENT — PAIN SCALES - GENERAL: PAINLEVEL_OUTOF10: 4

## 2021-03-18 NOTE — PLAN OF CARE
Problem: Infection:  Goal: Will remain free from infection  Description: Will remain free from infection  Outcome: Ongoing     Problem: Safety: Bed alarm in place and call light within reach  Goal: Free from accidental physical injury  Description: Free from accidental physical injury  Outcome: Ongoing  Goal: Free from intentional harm  Description: Free from intentional harm  Outcome: Ongoing     Problem: Daily Care:  Goal: Daily care needs are met  Description: Daily care needs are met  Outcome: Ongoing     Problem: Pain:  Goal: Patient's pain/discomfort is manageable  Description: Patient's pain/discomfort is manageable  Outcome: Ongoing  Goal: Pain level will decrease  Description: Pain level will decrease  Outcome: Ongoing  Goal: Control of acute pain  Description: Control of acute pain  Outcome: Ongoing  Goal: Control of chronic pain  Description: Control of chronic pain  Outcome: Ongoing     Problem: Skin Integrity:  Goal: Skin integrity will stabilize  Description: Skin integrity will stabilize  Outcome: Ongoing     Problem: Discharge Planning:  Goal: Patients continuum of care needs are met  Description: Patients continuum of care needs are met  Outcome: Ongoing     Problem: Skin Integrity:  Goal: Will show no infection signs and symptoms  Description: Will show no infection signs and symptoms  Outcome: Ongoing  Goal: Absence of new skin breakdown  Description: Absence of new skin breakdown  Outcome: Ongoing

## 2021-03-18 NOTE — PROGRESS NOTES
Pt Name: Julius Moya Record Number: [id number]  Date of Birth 1950   Today's Date: 3/18/2021      Subjective:  Awake in chair, denies pain, tolerating meals. Right flank dry, sutures intact. Chapa catheter intact with some hematuria as expected    ROS: Constitutional: No fever    Vitals:  Vitals:    03/17/21 2209 03/18/21 0606 03/18/21 0627 03/18/21 0845   BP:  (!) 144/80     Pulse:  76     Resp: 18 18     Temp:  98.1 °F (36.7 °C)     TempSrc:  Oral     SpO2: 92% 95%  94%   Weight:   162 lb 11.2 oz (73.8 kg)    Height:         I/O last 3 completed shifts: In: 1440 [P.O.:240; I.V.:1200]  Out: 2125 [Urine:2100; Blood:25]    Exam:  General: Awake, oriented, no acute distress  Respiratory: Nonlabored breathing  Abdomen: Soft, non-tender, non-distended, no masses  : chapa catheter intact  Skin: Skin color, texture, turgor normal, no rashes or lesions  Neurologic: no gross deficits    CURRENT MEDICATIONS   Scheduled Meds:   budesonide-formoterol  2 puff Inhalation BID    sodium chloride flush  10 mL Intravenous 2 times per day     Continuous Infusions:   sodium chloride 125 mL/hr at 03/18/21 0505     PRN Meds:.sodium chloride flush, ondansetron **OR** ondansetron, oxyCODONE, HYDROmorphone    LABS     Recent Labs     03/18/21  0624   WBC 16.1*   HGB 12.4   HCT 37.3         K 4.1      CO2 23   BUN 11   CREATININE 0.7   CALCIUM 8.6       ASSESSMENT   1. POD #1  2. 70y. o. female s/p right PCNL, antegrade stent placement and cystoscopy for a staghorn calculi with Dr. Yusra Rodriguez   1. Discharge home with chapa catheter in place x 1 week . 2. Right retrograde pyelogram with right ureteral stent removal will plan for approximately 3 weeks. 3. Follow up with me next week for catheter and suture removal in office.      MICHELLE Bustos - MAI 3/18/2021

## 2021-03-18 NOTE — PROGRESS NOTES
Pt is ready for discharge, IV removed without complications. Chapa left in place per Urology. Chapa care/ education provided to pt, all questions answered. Pt provided teach back education on chapa bag. Follow up appointments and management discussed. All questions answered at this time, will d/c.      Electronically signed by Lupillo Casas RN on 3/18/2021 at 12:53 PM

## 2021-03-18 NOTE — OP NOTE
[address] [place] [address] Elizabeth Garcia                                 OPERATIVE REPORT    PATIENT NAME: Marianela COBIAN                        :        1950  MED REC NO:   [id number]                          ROOM:       [id number]  ACCOUNT NO:   [de-identified]                           ADMIT DATE: 2021  PROVIDER:     Naya Bueno DO    DATE OF PROCEDURE:  2021    PREOPERATIVE DIAGNOSIS:  Right-sided staghorn calculus. POSTOPERATIVE DIAGNOSIS:  Right-sided staghorn calculus. OPERATION PERFORMED:  1. Cystourethroscopy with placement of occlusion balloon and retrograde  pyelogram.  2.  Right percutaneous nephrostolithotomy. 3.  Antegrade placement of double-J stent. 4.  Cystoscopy with repositioning of the double-J stent. SURGEON:  Naya Bueno DO    INDICATIONS:  This is a 66-year-old who presented with a partial  staghorn calculus of the upper pole of her kidney and renal pelvis. The  patient was counseled that the stone of this magnitude would need to be  treated percutaneously, and we discussed the procedure in detail and  scheduled her accordingly. OPERATIVE PROCEDURE:  The patient was initially taken to the  interventional radiology suite where she was placed in the frog-legged  position. She was given intravenous sedation. She was thoroughly  prepped and draped. Xylocaine jelly was placed per urethra. A flexible cystoscope was passed into the bladder and the right ureteral  orifice was cannulated with a ZIPwire. The cystoscope was removed, and  I passed an occlusion balloon over the ZIPwire and then performed a  retrograde pyelogram showing a large stone burden taking up the upper  pole and renal pelvis that was faintly radiopaque. At this point, the  Gandhi catheter was placed, and the patient's care was turned over to the  interventional radiologist for an upper pole nephrostomy placement.     After successful nephrostomy tube placement, the patient was now taken  to the surgery suite where she was placed under general anesthesia. She  was placed in a prone position and prepped and draped in the usual  sterile fashion. The 5-Sri [place]ankan nephrostomy tube was now utilized to  place a SuperStiff Amplatz wire to the level of the bladder under  fluoroscopic guidance. I then used a Haylee Wei catheter to get two  wires in place and this was done successfully. The nephrostomy site was then opened further with a scalpel and the  Microvasive NephroMax dilating balloon was utilized to dilate the  nephrostomy tract under fluoroscopic guidance to the level of the stone  in the upper pole kidney. I now passed a 30-Sri [place]ankan working sheath over  the inflated balloon again to the level of the upper pole where the  initial stone burden was seen. This was done under fluoroscopic  guidance. The balloon was deflated and removed, and then I passed the rigid  nephroscope and I was able to see the stone immediately. I utilized the  Samm device to simultaneously break up the stone and remove the  small fragments via suction. We had to stop several times during the  procedure to unclog the device, but eventually we were able to get all  of the stone out of the renal pelvis and then there was an adjacent  upper pole calyx that I worked on. It was towards the end of the  procedure that I noticed there was a good size rent in the renal pelvis. At this point, I had removed virtually all of the stone at most, there  were tiny fragments remaining and because of the rent, I did not want to  go any further with the procedure. I did do a nephrostogram confirming  that there was significant extravasation. At this point, I placed  FloSeal along the nephrostomy tract as I removed the sheath and  following this, I placed a 6 x 24 double-J stent over the Amplatz wire  and deployed it such there was a nice coil over the renal pelvis. Upon looking at the bladder after deploying the stent, in my judgement,  the stent was poorly positioned in her bladder, it was clearly too long  for the patient's anatomy and my concern was that the stent would become  displaced, and therefore, I made the clinical decision to flip the  patient over and place her in the dorsal lithotomy position. A cystoscope was now passed into the bladder and the stent was grasped  and pulled to the level of the urethral meatus. Paulina Darting was placed up  the stent under fluoroscopic guidance, and a 6 x 20 Nepali stent was now  passed under fluoroscopic guidance to the level of the renal pelvis and  deployed such there was a nice coil overlying the position of the renal  pelvis and a secondary coil in the bladder. The stent will be remaining  in place for three weeks, and she will have a retrograde pyelogram at  the time of its removal.  I elected not to place a nephrostomy tube at  the time of _____ from above for concern that the nephrostomy tube could  actually delay the healing of the rent in the renal pelvis. The patient was now taken to the recovery room in stable condition. ESTIMATED BLOOD LOSS:  200 mL.         Severiano Vivar DO    D: 03/17/2021 11:58:03       T: 03/17/2021 13:52:15     AQUILES/JESICA_JOSE_LATONIA  Job#: [id number]     Doc#: [id number]    CC:

## 2021-03-18 NOTE — PLAN OF CARE
Problem: Infection:  Goal: Will remain free from infection  Description: Will remain free from infection  3/18/2021 1046 by Jorge Sweeney RN  Outcome: Ongoing     Problem: Safety:  Goal: Free from accidental physical injury  Description: Free from accidental physical injury  3/18/2021 1046 by Jorge Sweeney RN  Outcome: Ongoing     Problem: Safety:  Goal: Free from intentional harm  Description: Free from intentional harm  3/18/2021 1046 by Jorge Sweeney RN  Outcome: Ongoing     Problem: Daily Care:  Goal: Daily care needs are met  Description: Daily care needs are met  3/18/2021 1046 by Jorge Sweeney RN  Outcome: Ongoing     Problem: Pain:  Goal: Patient's pain/discomfort is manageable  Description: Patient's pain/discomfort is manageable  3/18/2021 1046 by Jorge Sweeney RN  Outcome: Ongoing     Problem: Pain:  Goal: Pain level will decrease  Description: Pain level will decrease  3/18/2021 1046 by Jorge Sweeney RN  Outcome: Ongoing     Problem: Pain:  Goal: Control of acute pain  Description: Control of acute pain  3/18/2021 1046 by Jorge Sweeney RN  Outcome: Ongoing     Problem: Pain:  Goal: Control of chronic pain  Description: Control of chronic pain  3/18/2021 1046 by Jorge Sweeney RN  Outcome: Ongoing     Problem: Skin Integrity:  Goal: Skin integrity will stabilize  Description: Skin integrity will stabilize  3/18/2021 1046 by Jorge Sweeney RN  Outcome: Ongoing     Problem: Discharge Planning:  Goal: Patients continuum of care needs are met  Description: Patients continuum of care needs are met  3/18/2021 1046 by Jorge Sweeney RN  Outcome: Ongoing     Problem: Skin Integrity:  Goal: Will show no infection signs and symptoms  Description: Will show no infection signs and symptoms  3/18/2021 1046 by Jorge Sweeney RN  Outcome: Ongoing     Problem: Skin Integrity:  Goal: Absence of new skin breakdown  Description: Absence of new skin breakdown  3/18/2021 1046 by Jorge Sweeney RN  Outcome: Ongoing

## 2021-03-18 NOTE — DISCHARGE SUMMARY
Urology Discharge Summary      Patient Identification  Nessa Thomason is a 65 y.o. female. :  1950  Admit Date:  3/17/2021    Discharge date:   3/18/21                                  Disposition: home    Discharge Diagnoses:   Patient Active Problem List   Diagnosis    Renal calculus, right       Surgery: right PCNL, antegrade stent placement and cystoscopy for a staghorn calculi with Dr. Eustacio Saint    Activity:  No heavy lifting, pushing or pulling     Condition on discharge: stable    Follow-up: With Beatriz Salmon NP in 1 week and Dr. Eustacio Saint in 3 weeks. Hospital course: To OR for right PCNL, antegrade stent placement and cystoscopy for staghorn calculi with Dr. Eustacio Saint. Uneventful admission, tolerating meals, pain well controlled, chapa catheter intact. Discharge home in stable condition and f/u with Beatriz Salmon in 1 week for catheter and right flank suture removal. Will have retrograde pyelogram and ureteral stent removal in approximately 3 weeks with Dr. Eustacio Saint.     MICHELLE Cobian - CNP

## 2021-03-22 LAB
CALCULI COMPOSITION: NORMAL
MASS: NORMAL MG
STONE DESCRIPTION: NORMAL
STONE NUMBER: NORMAL
STONE SIZE: NORMAL MM

## 2021-12-25 ENCOUNTER — HOSPITAL ENCOUNTER (INPATIENT)
Age: 71
LOS: 3 days | Discharge: HOME | DRG: 872 | End: 2021-12-28
Attending: EMERGENCY MEDICINE | Admitting: INTERNAL MEDICINE
Payer: MEDICARE

## 2021-12-25 ENCOUNTER — APPOINTMENT (OUTPATIENT)
Dept: CT IMAGING | Age: 71
DRG: 872 | End: 2021-12-25
Payer: MEDICARE

## 2021-12-25 DIAGNOSIS — N12 PYELONEPHRITIS: Primary | ICD-10-CM

## 2021-12-25 PROBLEM — N39.0 UTI (URINARY TRACT INFECTION): Status: ACTIVE | Noted: 2021-12-25

## 2021-12-25 LAB
A/G RATIO: 1 (ref 1.1–2.2)
ALBUMIN SERPL-MCNC: 3.8 G/DL (ref 3.4–5)
ALP BLD-CCNC: 171 U/L (ref 40–129)
ALT SERPL-CCNC: 29 U/L (ref 10–40)
AMORPHOUS: ABNORMAL /HPF
ANION GAP SERPL CALCULATED.3IONS-SCNC: 12 MMOL/L (ref 3–16)
AST SERPL-CCNC: 28 U/L (ref 15–37)
BACTERIA: ABNORMAL /HPF
BANDED NEUTROPHILS RELATIVE PERCENT: 2 % (ref 0–7)
BASOPHILS ABSOLUTE: 0 K/UL (ref 0–0.2)
BASOPHILS RELATIVE PERCENT: 0 %
BILIRUB SERPL-MCNC: 2.8 MG/DL (ref 0–1)
BILIRUBIN URINE: ABNORMAL
BLOOD, URINE: ABNORMAL
BUN BLDV-MCNC: 15 MG/DL (ref 7–20)
CALCIUM SERPL-MCNC: 9.6 MG/DL (ref 8.3–10.6)
CHLORIDE BLD-SCNC: 95 MMOL/L (ref 99–110)
CLARITY: ABNORMAL
CO2: 25 MMOL/L (ref 21–32)
COLOR: YELLOW
CREAT SERPL-MCNC: 0.8 MG/DL (ref 0.6–1.2)
EOSINOPHILS ABSOLUTE: 0 K/UL (ref 0–0.6)
EOSINOPHILS RELATIVE PERCENT: 0 %
EPITHELIAL CELLS, UA: ABNORMAL /HPF (ref 0–5)
GFR AFRICAN AMERICAN: >60
GFR NON-AFRICAN AMERICAN: >60
GLUCOSE BLD-MCNC: 154 MG/DL (ref 70–99)
GLUCOSE URINE: NEGATIVE MG/DL
HCT VFR BLD CALC: 42.6 % (ref 36–48)
HEMOGLOBIN: 14.5 G/DL (ref 12–16)
KETONES, URINE: 15 MG/DL
LACTIC ACID: 2.1 MMOL/L (ref 0.4–2)
LEUKOCYTE ESTERASE, URINE: ABNORMAL
LIPASE: 14 U/L (ref 13–60)
LYMPHOCYTES ABSOLUTE: 2.2 K/UL (ref 1–5.1)
LYMPHOCYTES RELATIVE PERCENT: 13 %
MCH RBC QN AUTO: 29.6 PG (ref 26–34)
MCHC RBC AUTO-ENTMCNC: 34 G/DL (ref 31–36)
MCV RBC AUTO: 87.1 FL (ref 80–100)
MICROSCOPIC EXAMINATION: YES
MONOCYTES ABSOLUTE: 0.9 K/UL (ref 0–1.3)
MONOCYTES RELATIVE PERCENT: 5 %
MUCUS: ABNORMAL /LPF
NEUTROPHILS ABSOLUTE: 14 K/UL (ref 1.7–7.7)
NEUTROPHILS RELATIVE PERCENT: 80 %
NITRITE, URINE: POSITIVE
PDW BLD-RTO: 12.2 % (ref 12.4–15.4)
PH UA: 5.5 (ref 5–8)
PLATELET # BLD: 169 K/UL (ref 135–450)
PMV BLD AUTO: 7.9 FL (ref 5–10.5)
POTASSIUM SERPL-SCNC: 3.5 MMOL/L (ref 3.5–5.1)
PROTEIN UA: 100 MG/DL
RBC # BLD: 4.89 M/UL (ref 4–5.2)
RBC UA: ABNORMAL /HPF (ref 0–4)
RENAL EPITHELIAL, UA: ABNORMAL /HPF (ref 0–1)
SARS-COV-2, NAAT: NOT DETECTED
SODIUM BLD-SCNC: 132 MMOL/L (ref 136–145)
SPECIFIC GRAVITY UA: 1.02 (ref 1–1.03)
TOTAL PROTEIN: 7.7 G/DL (ref 6.4–8.2)
URINE REFLEX TO CULTURE: YES
URINE TYPE: ABNORMAL
UROBILINOGEN, URINE: 4 E.U./DL
WBC # BLD: 17.1 K/UL (ref 4–11)
WBC UA: ABNORMAL /HPF (ref 0–5)

## 2021-12-25 PROCEDURE — 6370000000 HC RX 637 (ALT 250 FOR IP): Performed by: EMERGENCY MEDICINE

## 2021-12-25 PROCEDURE — 87635 SARS-COV-2 COVID-19 AMP PRB: CPT

## 2021-12-25 PROCEDURE — 1200000000 HC SEMI PRIVATE

## 2021-12-25 PROCEDURE — G0378 HOSPITAL OBSERVATION PER HR: HCPCS

## 2021-12-25 PROCEDURE — 96361 HYDRATE IV INFUSION ADD-ON: CPT

## 2021-12-25 PROCEDURE — 85007 BL SMEAR W/DIFF WBC COUNT: CPT

## 2021-12-25 PROCEDURE — 2580000003 HC RX 258: Performed by: EMERGENCY MEDICINE

## 2021-12-25 PROCEDURE — 87040 BLOOD CULTURE FOR BACTERIA: CPT

## 2021-12-25 PROCEDURE — 36415 COLL VENOUS BLD VENIPUNCTURE: CPT

## 2021-12-25 PROCEDURE — 96365 THER/PROPH/DIAG IV INF INIT: CPT

## 2021-12-25 PROCEDURE — 99285 EMERGENCY DEPT VISIT HI MDM: CPT

## 2021-12-25 PROCEDURE — 87086 URINE CULTURE/COLONY COUNT: CPT

## 2021-12-25 PROCEDURE — 83690 ASSAY OF LIPASE: CPT

## 2021-12-25 PROCEDURE — 6360000002 HC RX W HCPCS: Performed by: EMERGENCY MEDICINE

## 2021-12-25 PROCEDURE — 85027 COMPLETE CBC AUTOMATED: CPT

## 2021-12-25 PROCEDURE — 74177 CT ABD & PELVIS W/CONTRAST: CPT

## 2021-12-25 PROCEDURE — 83605 ASSAY OF LACTIC ACID: CPT

## 2021-12-25 PROCEDURE — 80053 COMPREHEN METABOLIC PANEL: CPT

## 2021-12-25 PROCEDURE — 81001 URINALYSIS AUTO W/SCOPE: CPT

## 2021-12-25 PROCEDURE — 6360000004 HC RX CONTRAST MEDICATION: Performed by: EMERGENCY MEDICINE

## 2021-12-25 RX ORDER — 0.9 % SODIUM CHLORIDE 0.9 %
500 INTRAVENOUS SOLUTION INTRAVENOUS ONCE
Status: COMPLETED | OUTPATIENT
Start: 2021-12-25 | End: 2021-12-25

## 2021-12-25 RX ORDER — 0.9 % SODIUM CHLORIDE 0.9 %
1000 INTRAVENOUS SOLUTION INTRAVENOUS ONCE
Status: COMPLETED | OUTPATIENT
Start: 2021-12-25 | End: 2021-12-25

## 2021-12-25 RX ORDER — ACETAMINOPHEN 325 MG/1
650 TABLET ORAL ONCE
Status: COMPLETED | OUTPATIENT
Start: 2021-12-25 | End: 2021-12-25

## 2021-12-25 RX ADMIN — CEFTRIAXONE 1000 MG: 1 INJECTION, POWDER, FOR SOLUTION INTRAMUSCULAR; INTRAVENOUS at 16:57

## 2021-12-25 RX ADMIN — IOPAMIDOL 100 ML: 755 INJECTION, SOLUTION INTRAVENOUS at 17:09

## 2021-12-25 RX ADMIN — SODIUM CHLORIDE 500 ML: 9 INJECTION, SOLUTION INTRAVENOUS at 17:46

## 2021-12-25 RX ADMIN — SODIUM CHLORIDE 1000 ML: 9 INJECTION, SOLUTION INTRAVENOUS at 16:18

## 2021-12-25 RX ADMIN — ACETAMINOPHEN 650 MG: 325 TABLET ORAL at 16:18

## 2021-12-25 ASSESSMENT — PAIN SCALES - GENERAL
PAINLEVEL_OUTOF10: 0

## 2021-12-25 NOTE — ED NOTES
Lactic 2.1 Dr. Yocasta Nash states, we don't need to repeat second lactic      Nilam Agarwal RN  12/25/21 8224

## 2021-12-25 NOTE — ED NOTES
Son is aware patient is getting admitted. Patient has a dog at home. Son will be taking care of dog.       Saurabh Vickers RN  12/25/21 5131

## 2021-12-25 NOTE — ED PROVIDER NOTES
Triage Chief Complaint:   Urinary Tract Infection (c/o back pain x 3 days. Thinks she has uti )    [place]:  Jayden Frost is a 66 y.o. female that presents for evaluation of bilateral atraumatic flank pain x3 days. The patient has a past medical history of both UTI and staghorn calculus requiring urological intervention. She states she is concerned that there may be an issue with him with her kidneys. She states that she has been feeling subjectively warm. No nausea no vomiting no chest pain reported. She arrives with a temperature of 102 °F, pulse 105, /79, 16 respirations 94% room air. ROS:  At least 12 systems reviewed and otherwise acutely negative except as in the [address] Past Medical History:   Diagnosis Date    Asthma     Kidney stones      Past Surgical History:   Procedure Laterality Date    ELBOW FRACTURE SURGERY Right     WITH LEFT ANKLE FRACTURE SURGERY     FEMUR SURGERY Right     FRACTURE    FRACTURE SURGERY      RIGHT ELBOW AND LEFT ANKLE    HARDWARE REMOVAL      SCREW REMOVAL FROM FEMUR    IR URETERAL CATHETER OR STENT PLACEMENT  3/17/2021    IR URETERAL CATHETER OR STENT PLACEMENT 3/17/2021 [facility]EDURES    JOINT REPLACEMENT Left     KIDNEY REMOVAL Right 3/17/2021    RIGHT PERCUTANEOUS NEPHROLITHOTOMY, CYSTOSCOPY performed by Manuela Joe DO at [facility] History   Problem Relation Age of Onset    Breast Cancer Mother     Lupus Mother     Tuberculosis Mother     Heart Failure Father     Emphysema Sister     Cancer Brother      Social History     Socioeconomic History    Marital status:       Spouse name: Not on file    Number of children: Not on file    Years of education: Not on file    Highest education level: Not on file   Occupational History    Not on file   Tobacco Use    Smoking status: Never Smoker    Smokeless tobacco: Never Used   Vaping Use    Vaping Use: Never used   Substance and Sexual Activity    Alcohol use: Not Currently  Drug use: Never    Sexual activity: Not Currently   Other Topics Concern    Not on file   Social History Narrative    Not on file     Social Determinants of Health     Financial Resource Strain:     Difficulty of Paying Living Expenses: Not on file   Food Insecurity:     Worried About Running Out of Food in the Last Year: Not on file    [place]ayne of Food in the Last Year: Not on file   Transportation Needs:     Lack of Transportation (Medical): Not on file    Lack of Transportation (Non-Medical):  Not on file   Physical Activity:     Days of Exercise per Week: Not on file    Minutes of Exercise per Session: Not on file   Stress:     Feeling of Stress : Not on file   Social Connections:     Frequency of Communication with Friends and Family: Not on file    Frequency of Social Gatherings with Friends and Family: Not on file    Attends Catholic Services: Not on file    Active Member of [address] [place] [address] [employer] or Organizations: Not on file    Attends Club or Organization Meetings: Not on file    Marital Status: Not on file   Intimate Partner Violence:     Fear of Current or Ex-Partner: Not on file    Emotionally Abused: Not on file    Physically Abused: Not on file    Sexually Abused: Not on file   Housing Stability:     Unable to Pay for Housing in the Last Year: Not on file    Number of [place] in the Last Year: Not on file    Unstable Housing in the Last Year: Not on file     Current Facility-Administered Medications   Medication Dose Route Frequency Provider Last Rate Last Admin    cefTRIAXone (ROCEPHIN) 1000 mg IVPB in 50 mL D5W minibag  1,000 mg IntraVENous Once Zuleyka Boggs  mL/hr at 12/25/21 1657 1,000 mg at 12/25/21 1657     Current Outpatient Medications   Medication Sig Dispense Refill    fluticasone-salmeterol (ADVAIR) 100-50 MCG/DOSE diskus inhaler Inhale 1 puff into the lungs 2 times daily       No Known Allergies    [unfilled]    Nursing Notes Reviewed    Physical Exam:  Vitals:    12/25/21 1559   BP: 123/79   Pulse: 105   Resp: 16   Temp: 102 °F (38.9 °C)   SpO2: 94%       GENERAL APPEARANCE: Awake and alert. Cooperative. No acute distress. HEAD: Normocephalic. Atraumatic. EYES: EOM's grossly intact. Sclera anicteric. ENT: Mucous membranes are moist. Tolerates saliva. No trismus. NECK: Supple. No meningismus. Trachea midline. HEART: RRR. Radial pulses 2+. LUNGS: Respirations unlabored. CTAB  ABDOMEN: Soft. Non-tender. No guarding or rebound. : TTP b/l CVA  EXTREMITIES: No acute deformities. SKIN: Warm and dry. NEUROLOGICAL: No gross facial drooping. Moves all 4 extremities spontaneously. PSYCHIATRIC: Normal mood.     I have reviewed and interpreted all of the currently available lab results from this visit (if applicable):  Results for orders placed or performed during the hospital encounter of 12/25/21   Urine reflex to culture    Specimen: Urine, clean catch   Result Value Ref Range    Color, UA Yellow Straw/Yellow    Clarity, UA CLOUDY (A) Clear    Glucose, Ur Negative Negative mg/dL    Bilirubin Urine MODERATE (A) Negative    Ketones, Urine 15 (A) Negative mg/dL    Specific Gravity, UA 1.025 1.005 - 1.030    Blood, Urine LARGE (A) Negative    pH, UA 5.5 5.0 - 8.0    Protein,  (A) Negative mg/dL    Urobilinogen, Urine 4.0 (A) <2.0 E.U./dL    Nitrite, Urine POSITIVE (A) Negative    Leukocyte Esterase, Urine SMALL (A) Negative    Microscopic Examination YES     Urine Type Cleancatch     Urine Reflex to Culture Yes    Microscopic Urinalysis   Result Value Ref Range    Mucus, UA Rare (A) None Seen /LPF    WBC, UA 21-50 (A) 0 - 5 /HPF    RBC, UA 5-10 (A) 0 - 4 /HPF    Epithelial Cells, UA 2-5 0 - 5 /HPF    Renal Epithelial, UA 0-1 0 - 1 /HPF    Bacteria, UA 2+ (A) None Seen /HPF    Amorphous, UA 1+ /HPF   Comprehensive Metabolic Panel   Result Value Ref Range    Sodium 132 (L) 136 - 145 mmol/L    Potassium 3.5 3.5 - 5.1 mmol/L    Chloride 95 (L) 99 - 110 mmol/L    CO2 25 21 - 32 mmol/L    Anion Gap 12 3 - 16    Glucose 154 (H) 70 - 99 mg/dL    BUN 15 7 - 20 mg/dL    CREATININE 0.8 0.6 - 1.2 mg/dL    GFR Non-African American >60 >60    GFR African American >60 >60    Calcium 9.6 8.3 - 10.6 mg/dL    Total Protein 7.7 6.4 - 8.2 g/dL    Albumin 3.8 3.4 - 5.0 g/dL    Albumin/Globulin Ratio 1.0 (L) 1.1 - 2.2    Total Bilirubin 2.8 (H) 0.0 - 1.0 mg/dL    Alkaline Phosphatase 171 (H) 40 - 129 U/L    ALT 29 10 - 40 U/L    AST 28 15 - 37 U/L   Lipase   Result Value Ref Range    Lipase 14.0 13.0 - 60.0 U/L   Lactic Acid, Plasma   Result Value Ref Range    Lactic Acid 2.1 (H) 0.4 - 2.0 mmol/L   CBC with Manual Differential   Result Value Ref Range    WBC 17.1 (H) 4.0 - 11.0 K/uL    RBC 4.89 4.00 - 5.20 M/uL    Hemoglobin 14.5 12.0 - 16.0 g/dL    Hematocrit 42.6 36.0 - 48.0 %    MCV 87.1 80.0 - 100.0 fL    MCH 29.6 26.0 - 34.0 pg    MCHC 34.0 31.0 - 36.0 g/dL    RDW 12.2 (L) 12.4 - 15.4 %    Platelets 225 861 - 705 K/uL    MPV 7.9 5.0 - 10.5 fL        Radiographs (if obtained):  [] The following radiograph was interpreted by myself in the absence of a radiologist:  [x] Radiologist's Report Reviewed:       CT ABDOMEN PELVIS W IV CONTRAST Additional Contrast? None (Final result)  Result time 12/25/21 17:48:44  Final result by Saul Martínez MD (12/25/21 17:48:44)                Impression:    Questionable minimal inflammatory changes and fluid along the posterior   aspect of the pancreas extending inferiorly into the pararenal space which is   suspicious for possible mild subacute pancreatitis with no obvious mass or   focal fluid collection.  Recommend correlating with lab values and follow-up   to assure resolution. Questionable early inflammatory changes along the retroperitoneum extending   into the upper pelvis which could be due to the pancreatitis or possible   inflammatory changes or scarring around both kidneys extending inferiorly.      Moderate stranding around both kidneys which is more prominent and could be   due to scarring or early inflammation with no hydronephrosis or renal stones. Moderate diverticulosis of the left colon which is most severe along the   sigmoid region which is unchanged with no pericolonic inflammation     Atrophic uterus with moderate hypodensity centrally which could be due to   hyperplasia or endometrial carcinoma.  Recommend ultrasound follow-up     Questionable mild small-bowel ileus or less likely an early partial   obstruction distally.  Recommend follow-up             Narrative:    EXAMINATION:   CT OF THE ABDOMEN AND PELVIS WITH CONTRAST 12/25/2021 5:06 pm     TECHNIQUE:   CT of the abdomen and pelvis was performed with the administration of   intravenous contrast. Multiplanar reformatted images are provided for review. Dose modulation, iterative reconstruction, and/or weight based adjustment of   the mA/kV was utilized to reduce the radiation dose to as low as reasonably   achievable. COMPARISON:   11/07/2020     HISTORY:   ORDERING SYSTEM PROVIDED HISTORY: b/l flank pain   TECHNOLOGIST PROVIDED HISTORY:   Reason for exam:->b/l flank pain   Additional Contrast?->None   Decision Support Exception - unselect if not a suspected or confirmed   emergency medical condition->Emergency Medical Condition (MA)   Reason for Exam: bilateral flank pain fever uti sx. Relevant Medical/Surgical History: hx staghorn stone removal from right kidney     FINDINGS:   Lower Chest: There mild linear densities along the lung bases anteriorly   which are unchanged. Organs: The liver is mildly enlarged with hypertrophy of the caudate and left   lobes and fatty replacement throughout which is more prominent. Evens Cogan is a   tiny 5 mm hypodensity along the right lobe inferiorly which is unchanged.    The gallbladder is unremarkable with slight prominence of the central biliary   ducts and common bile duct which tapers distally and is unchanged.  The pancreas is normal size with some minimal stranding and fluid in the   peripancreatic soft tissues posteriorly along the body of the pancreas which   is more apparent. Dick Moles is no focal fluid collection or mass. Dick Moles is some   minimal stranding and thickening of the pararenal fascia extending inferiorly   into the upper pelvis along the retroperitoneum with no focal fluid   collection or mass. Dick Moles is prominent stranding around both kidneys which   is more prominent with no hydronephrosis or renal stones. GI/Bowel:   There is some mildly dilated loops of small bowel along the lower   abdomen.  The appendix is normal.  There are diverticula throughout the   transverse and left colon with most severe along the sigmoid region which is   unchanged with no pericolonic inflammation. Pelvis: The uterus is atrophic with hypodensity and thickening of the   endometrium which is more apparent.  The bladder is unremarkable. Dick Moles is   no adnexal or ovarian mass and no no significant free fluid is seen     Peritoneum/Retroperitoneum: There is moderate calcified plaque throughout the   aorta with a which is mildly dilated throughout with no aneurysm or   dissection.  No retroperitoneal mass or adenopathy is seen     Bones/Soft Tissues:   The bones are intact                   EKG (if obtained): (All EKG's are interpreted by myself in the absence of a cardiologist)  Initial EKG on my interpretation shows n/a. MDM:  Differential diagnosis: UTI, pyelonephritis, renal stone, sepsis, Covid    Patient arrives febrile/tachycardic. IV fluid along with acetaminophen given. Cultures taken. White blood cell count 17, lactic acid 2.1 and urine consistent with infection. The patient is septic. Ceftriaxone started. Lipase normal.  CMP shows sodium 132, glucose 154, chloride 95, total bilirubin 2.8, alk phos 171    CTAP: Consistent with pyelonephritis.   Questionable finding of pancreatitis is not clinically correlated. No acute surgical issue noted. Patient accepted to [facility] by Dr. Italo Nichols. Old records reviewed. Labs and imaging reviewed and results discussed with patient. Patient was given scripts for the following medications. I counseled patient how to take these medications. New Prescriptions    No medications on file         CRITICAL CARE TIME   Total Critical Care time was 30 minutes, excluding separately reportable procedures. There was a high probability of clinically significant/life threatening deterioration in the patient's condition which required my urgent intervention.       Clinical Impression:  Sepsis, pyelonephritis  (Please note that portions of this note may have been completed with a voice recognition program. Efforts were made to edit the dictations but occasionally words are mis-transcribed.)    MD Marlo Alexander MD  12/25/21 9084

## 2021-12-26 LAB
A/G RATIO: 0.8 (ref 1.1–2.2)
ALBUMIN SERPL-MCNC: 2.8 G/DL (ref 3.4–5)
ALP BLD-CCNC: 163 U/L (ref 40–129)
ALT SERPL-CCNC: 30 U/L (ref 10–40)
ANION GAP SERPL CALCULATED.3IONS-SCNC: 11 MMOL/L (ref 3–16)
APTT: 32.5 SEC (ref 26.2–38.6)
AST SERPL-CCNC: 30 U/L (ref 15–37)
BASOPHILS ABSOLUTE: 0 K/UL (ref 0–0.2)
BASOPHILS RELATIVE PERCENT: 0.3 %
BILIRUB SERPL-MCNC: 2 MG/DL (ref 0–1)
BUN BLDV-MCNC: 13 MG/DL (ref 7–20)
CALCIUM SERPL-MCNC: 8.7 MG/DL (ref 8.3–10.6)
CHLORIDE BLD-SCNC: 99 MMOL/L (ref 99–110)
CO2: 23 MMOL/L (ref 21–32)
CREAT SERPL-MCNC: 0.7 MG/DL (ref 0.6–1.2)
EOSINOPHILS ABSOLUTE: 0 K/UL (ref 0–0.6)
EOSINOPHILS RELATIVE PERCENT: 0 %
GFR AFRICAN AMERICAN: >60
GFR NON-AFRICAN AMERICAN: >60
GLUCOSE BLD-MCNC: 130 MG/DL (ref 70–99)
HCT VFR BLD CALC: 36.9 % (ref 36–48)
HEMOGLOBIN: 12.5 G/DL (ref 12–16)
INR BLD: 1.35 (ref 0.88–1.12)
LACTIC ACID, SEPSIS: 0.9 MMOL/L (ref 0.4–1.9)
LACTIC ACID, SEPSIS: 1.2 MMOL/L (ref 0.4–1.9)
LIPASE: 19 U/L (ref 13–60)
LYMPHOCYTES ABSOLUTE: 0.6 K/UL (ref 1–5.1)
LYMPHOCYTES RELATIVE PERCENT: 4.7 %
MAGNESIUM: 2.1 MG/DL (ref 1.8–2.4)
MCH RBC QN AUTO: 29.9 PG (ref 26–34)
MCHC RBC AUTO-ENTMCNC: 33.8 G/DL (ref 31–36)
MCV RBC AUTO: 88.6 FL (ref 80–100)
MONOCYTES ABSOLUTE: 1.4 K/UL (ref 0–1.3)
MONOCYTES RELATIVE PERCENT: 10.4 %
NEUTROPHILS ABSOLUTE: 11.4 K/UL (ref 1.7–7.7)
NEUTROPHILS RELATIVE PERCENT: 84.6 %
PDW BLD-RTO: 13.1 % (ref 12.4–15.4)
PLATELET # BLD: 125 K/UL (ref 135–450)
PMV BLD AUTO: 8.1 FL (ref 5–10.5)
POTASSIUM REFLEX MAGNESIUM: 3.1 MMOL/L (ref 3.5–5.1)
PROTHROMBIN TIME: 15.5 SEC (ref 9.9–12.7)
RBC # BLD: 4.17 M/UL (ref 4–5.2)
SODIUM BLD-SCNC: 133 MMOL/L (ref 136–145)
TOTAL PROTEIN: 6.3 G/DL (ref 6.4–8.2)
WBC # BLD: 13.5 K/UL (ref 4–11)

## 2021-12-26 PROCEDURE — G0378 HOSPITAL OBSERVATION PER HR: HCPCS

## 2021-12-26 PROCEDURE — 83735 ASSAY OF MAGNESIUM: CPT

## 2021-12-26 PROCEDURE — 6370000000 HC RX 637 (ALT 250 FOR IP): Performed by: STUDENT IN AN ORGANIZED HEALTH CARE EDUCATION/TRAINING PROGRAM

## 2021-12-26 PROCEDURE — 85025 COMPLETE CBC W/AUTO DIFF WBC: CPT

## 2021-12-26 PROCEDURE — 83605 ASSAY OF LACTIC ACID: CPT

## 2021-12-26 PROCEDURE — 2580000003 HC RX 258: Performed by: NURSE PRACTITIONER

## 2021-12-26 PROCEDURE — 85610 PROTHROMBIN TIME: CPT

## 2021-12-26 PROCEDURE — 96372 THER/PROPH/DIAG INJ SC/IM: CPT

## 2021-12-26 PROCEDURE — 6370000000 HC RX 637 (ALT 250 FOR IP): Performed by: NURSE PRACTITIONER

## 2021-12-26 PROCEDURE — 80053 COMPREHEN METABOLIC PANEL: CPT

## 2021-12-26 PROCEDURE — 6360000002 HC RX W HCPCS: Performed by: NURSE PRACTITIONER

## 2021-12-26 PROCEDURE — 96366 THER/PROPH/DIAG IV INF ADDON: CPT

## 2021-12-26 PROCEDURE — 1200000000 HC SEMI PRIVATE

## 2021-12-26 PROCEDURE — 85730 THROMBOPLASTIN TIME PARTIAL: CPT

## 2021-12-26 PROCEDURE — 96361 HYDRATE IV INFUSION ADD-ON: CPT

## 2021-12-26 PROCEDURE — 36415 COLL VENOUS BLD VENIPUNCTURE: CPT

## 2021-12-26 PROCEDURE — 83690 ASSAY OF LIPASE: CPT

## 2021-12-26 PROCEDURE — 94761 N-INVAS EAR/PLS OXIMETRY MLT: CPT

## 2021-12-26 PROCEDURE — 94640 AIRWAY INHALATION TREATMENT: CPT

## 2021-12-26 PROCEDURE — 96375 TX/PRO/DX INJ NEW DRUG ADDON: CPT

## 2021-12-26 RX ORDER — ONDANSETRON 2 MG/ML
4 INJECTION INTRAMUSCULAR; INTRAVENOUS EVERY 6 HOURS PRN
Status: DISCONTINUED | OUTPATIENT
Start: 2021-12-26 | End: 2021-12-28 | Stop reason: HOSPADM

## 2021-12-26 RX ORDER — SODIUM CHLORIDE 0.9 % (FLUSH) 0.9 %
5-40 SYRINGE (ML) INJECTION PRN
Status: DISCONTINUED | OUTPATIENT
Start: 2021-12-26 | End: 2021-12-28 | Stop reason: HOSPADM

## 2021-12-26 RX ORDER — ACETAMINOPHEN 325 MG/1
650 TABLET ORAL EVERY 6 HOURS PRN
Status: DISCONTINUED | OUTPATIENT
Start: 2021-12-26 | End: 2021-12-28 | Stop reason: HOSPADM

## 2021-12-26 RX ORDER — POLYETHYLENE GLYCOL 3350 17 G/17G
17 POWDER, FOR SOLUTION ORAL DAILY PRN
Status: DISCONTINUED | OUTPATIENT
Start: 2021-12-26 | End: 2021-12-28 | Stop reason: HOSPADM

## 2021-12-26 RX ORDER — POTASSIUM CHLORIDE 20 MEQ/1
60 TABLET, EXTENDED RELEASE ORAL ONCE
Status: COMPLETED | OUTPATIENT
Start: 2021-12-26 | End: 2021-12-26

## 2021-12-26 RX ORDER — SODIUM CHLORIDE 9 MG/ML
25 INJECTION, SOLUTION INTRAVENOUS PRN
Status: DISCONTINUED | OUTPATIENT
Start: 2021-12-26 | End: 2021-12-26 | Stop reason: SDUPTHER

## 2021-12-26 RX ORDER — SODIUM CHLORIDE 0.9 % (FLUSH) 0.9 %
5-40 SYRINGE (ML) INJECTION EVERY 12 HOURS SCHEDULED
Status: DISCONTINUED | OUTPATIENT
Start: 2021-12-26 | End: 2021-12-28 | Stop reason: HOSPADM

## 2021-12-26 RX ORDER — SODIUM CHLORIDE 0.9 % (FLUSH) 0.9 %
5-40 SYRINGE (ML) INJECTION PRN
Status: DISCONTINUED | OUTPATIENT
Start: 2021-12-26 | End: 2021-12-26 | Stop reason: SDUPTHER

## 2021-12-26 RX ORDER — ACETAMINOPHEN 650 MG/1
650 SUPPOSITORY RECTAL EVERY 6 HOURS PRN
Status: DISCONTINUED | OUTPATIENT
Start: 2021-12-26 | End: 2021-12-28 | Stop reason: HOSPADM

## 2021-12-26 RX ORDER — ONDANSETRON 4 MG/1
4 TABLET, ORALLY DISINTEGRATING ORAL EVERY 8 HOURS PRN
Status: DISCONTINUED | OUTPATIENT
Start: 2021-12-26 | End: 2021-12-28 | Stop reason: HOSPADM

## 2021-12-26 RX ORDER — SODIUM CHLORIDE 9 MG/ML
25 INJECTION, SOLUTION INTRAVENOUS PRN
Status: DISCONTINUED | OUTPATIENT
Start: 2021-12-26 | End: 2021-12-28 | Stop reason: HOSPADM

## 2021-12-26 RX ORDER — SODIUM CHLORIDE 0.9 % (FLUSH) 0.9 %
5-40 SYRINGE (ML) INJECTION EVERY 12 HOURS SCHEDULED
Status: DISCONTINUED | OUTPATIENT
Start: 2021-12-26 | End: 2021-12-26 | Stop reason: SDUPTHER

## 2021-12-26 RX ORDER — SODIUM CHLORIDE 9 MG/ML
INJECTION, SOLUTION INTRAVENOUS CONTINUOUS
Status: DISCONTINUED | OUTPATIENT
Start: 2021-12-26 | End: 2021-12-28

## 2021-12-26 RX ORDER — BUDESONIDE AND FORMOTEROL FUMARATE DIHYDRATE 80; 4.5 UG/1; UG/1
2 AEROSOL RESPIRATORY (INHALATION) 2 TIMES DAILY
Status: DISCONTINUED | OUTPATIENT
Start: 2021-12-26 | End: 2021-12-28 | Stop reason: HOSPADM

## 2021-12-26 RX ADMIN — SODIUM CHLORIDE, PRESERVATIVE FREE 10 ML: 5 INJECTION INTRAVENOUS at 08:44

## 2021-12-26 RX ADMIN — POTASSIUM CHLORIDE 60 MEQ: 1500 TABLET, EXTENDED RELEASE ORAL at 06:42

## 2021-12-26 RX ADMIN — ENOXAPARIN SODIUM 40 MG: 100 INJECTION SUBCUTANEOUS at 08:40

## 2021-12-26 RX ADMIN — BUDESONIDE AND FORMOTEROL FUMARATE DIHYDRATE 2 PUFF: 80; 4.5 AEROSOL RESPIRATORY (INHALATION) at 21:17

## 2021-12-26 RX ADMIN — CEFTRIAXONE 1000 MG: 1 INJECTION, POWDER, FOR SOLUTION INTRAMUSCULAR; INTRAVENOUS at 16:31

## 2021-12-26 RX ADMIN — BUDESONIDE AND FORMOTEROL FUMARATE DIHYDRATE 2 PUFF: 80; 4.5 AEROSOL RESPIRATORY (INHALATION) at 08:01

## 2021-12-26 RX ADMIN — SODIUM CHLORIDE, PRESERVATIVE FREE 10 ML: 5 INJECTION INTRAVENOUS at 21:52

## 2021-12-26 RX ADMIN — ONDANSETRON 4 MG: 2 INJECTION INTRAMUSCULAR; INTRAVENOUS at 17:35

## 2021-12-26 RX ADMIN — SODIUM CHLORIDE: 9 INJECTION, SOLUTION INTRAVENOUS at 17:22

## 2021-12-26 RX ADMIN — SODIUM CHLORIDE: 9 INJECTION, SOLUTION INTRAVENOUS at 02:35

## 2021-12-26 NOTE — ED NOTES
Updated on plan of care. Warm blankets given while awaiting transport.  Offered drink/      Lane Grubbs RN  12/25/21 5749

## 2021-12-26 NOTE — PROGRESS NOTES
Pt arrived to floor. Alert and oriented. Oriented to room. Bed in low position. Call light within reach. Pinky Angles Pinky Angles Henrry Finney RN

## 2021-12-26 NOTE — PLAN OF CARE
Problem: Falls - Risk of:  Goal: Will remain free from falls  Description: Will remain free from falls  12/26/2021 0745 by Dorita Habermann, RN  Outcome: Ongoing   Will remain free from falls. Fall precautions are in place. Call light, telephone and bedside table are within reach.    Problem: Falls - Risk of:  Goal: Absence of physical injury  Description: Absence of physical injury  12/26/2021 0745 by Dorita Habermann, RN  Outcome: Ongoing     Problem: Skin Integrity:  Goal: Will show no infection signs and symptoms  Description: Will show no infection signs and symptoms  Outcome: Ongoing     Problem: Skin Integrity:  Goal: Absence of new skin breakdown  Description: Absence of new skin breakdown  Outcome: Ongoing

## 2021-12-26 NOTE — ACP (ADVANCE CARE PLANNING)
Outcomes:  [x] ACP discussion completed  [] Existing advance directive reviewed with patient; no changes to patient's previously recorded wishes  [] New Advance Directive completed  [] Portable Do Not Rescitate prepared for Provider review and signature  [] POLST/POST/MOLST/MOST prepared for Provider review and signature      Follow-up plan:    [] Schedule follow-up conversation to continue planning  [] Referred individual to Provider for additional questions/concerns   [] Advised patient/agent/surrogate to review completed ACP document and update if needed with changes in condition, patient preferences or care setting    [x] This note routed to one or more involved healthcare providers    Electronically signed by Jose Dukes MSN RN-BC [facility]

## 2021-12-26 NOTE — PROGRESS NOTES
Patient resting in bed. Alert and oriented x4. Uneventful night. Bed in low position. Call light within reach. Lawernce Naveen Dexter RN

## 2021-12-26 NOTE — CARE COORDINATION
Case Management Assessment           Initial Evaluation                Date / Time of Evaluation: 12/26/2021 10:26 AM                 Assessment Completed by: Salena Monroe RN    Patient Name: Diomedes Cullen     YOB: 1950  Diagnosis: UTI (urinary tract infection) [N39.0]     Date / Time: 12/25/2021  3:50 PM    Patient Admission Status: Inpatient    Current PCP: Idania Moreno MD    Chart Reviewed: Yes  Patient/ Family Interviewed: Yes    Emergency Contacts:  Extended Emergency Contact Information  Primary Emergency Contact: Nathalia Sethi  Home Phone: [phone]  Relation: Child  Secondary Emergency Contact: kianna marrero  Home Phone: [phone]  Relation: Child    Advance Directives:   Code Status: Full Code    Financial  Payor: Walker Tam / Plan: Cloria Goltz ESSENTIAL/PLUS / Product Type: *No Product type* /     [address] [phone] Fernando Cage [phone]  [address] [place] [address]  Phone: [phone] Fax: [phone]    [facility] [address] [phone] Fernando Cage [phone]  [address] [facility] [address]  Phone: [phone] Fax: [phone]      Potential assistance Purchasing Medications: Potential Assistance Purchasing Medications: No    ADLS Independent with all ADLs and an active   Support Systems: Children    HOUSING  Home Environment: house. Lives alone with her dog.  Supported by family in the community  Steps: 3 ALIYAH    Plans to RETURN to current housing: Yes    Durable Medical Equipment  NONE    DISCHARGE PLAN:  Disposition: Home- No Services Needed    Transportation PLAN for discharge: family     The Patient and/or patient representative Jericho Green and her family were provided with a choice of provider and agrees with the discharge plan Yes    Freedom of choice list was provided with basic dialogue that supports the patient's individualized plan of care/goals and shares the quality data associated with the providers.  Yes       Hilda Isidro RN  Case Management  [phone]

## 2021-12-26 NOTE — PROGRESS NOTES
Patient is A&O. Patient is resting in bed, awake and quiet. Patient denies pain at present. Room air. Side rails are up x2. Fall precautions are in place. Bed alarm on. Bed is in lowest position. Call light, telephone and bedside table are within reach. VSS taken. AM meds given. Shift assessment completed. Will continue to monitor patient per unit protocols.  Electronically signed by Srinivas Pichardo RN on 12/26/2021 at 11:01 AM

## 2021-12-26 NOTE — H&P
Hospital Medicine History & Physical      PCP: Pearl Jeong MD    Date of Admission: 12/25/2021    Date of Service: Pt seen/examined on 12/26/2021 and Admitted to Inpatient with expected LOS greater than two midnights due to medical therapy. Chief Complaint:  Flank pain      History Of Present Illness:      66 y.o. female with PMHx of Asthma and Kidney stones presented to [facility] in transfer from [place] emergency department with bilateral flank pain. Patient reports pain began 3 days ago. She has history of staghorn calculi which have required urological intervention. She has reports of fever, flank pain and slight suprapubic abdominal discomfort. She denies any dysuria or hematuria. No nausea or vomiting. Past Medical History:          Diagnosis Date    Asthma     Kidney stones        Past Surgical History:          Procedure Laterality Date    ELBOW FRACTURE SURGERY Right     WITH LEFT ANKLE FRACTURE SURGERY     FEMUR SURGERY Right     FRACTURE    FRACTURE SURGERY      RIGHT ELBOW AND LEFT ANKLE    HARDWARE REMOVAL      SCREW REMOVAL FROM FEMUR    IR URETERAL CATHETER OR STENT PLACEMENT  3/17/2021    IR URETERAL CATHETER OR STENT PLACEMENT 3/17/2021 [facility]    JOINT REPLACEMENT Left     KIDNEY REMOVAL Right 3/17/2021    RIGHT PERCUTANEOUS NEPHROLITHOTOMY, CYSTOSCOPY [facility] [address]       Medications Prior to Admission:      Prior to Admission medications    Medication Sig Start Date End Date Taking? Authorizing Provider   fluticasone-salmeterol (ADVAIR) 100-50 MCG/DOSE diskus inhaler Inhale 1 puff into the lungs 2 times daily 1/5/21  Yes Historical Provider, MD       Allergies:  Patient has no known allergies. Social History:      TOBACCO:   reports that she has never smoked. She has never used smokeless tobacco.  ETOH:   reports previous alcohol use.       Family History:      Reviewed in detail positive as follows: pressure will be monitored closely  - Continue IV fluids 100 ml/hr  - Rocephin given in ED and will continue  - Urine and Blood cultures pending  - Currently Hemodynamically stable. - CT abd/pel: Moderate stranding around both kidneys which is more prominent and could be due to scarring or early inflammation with no hydronephrosis or renal stones. DVT Prophylaxis: Lovenox  Diet: ADULT DIET; Regular  Code Status: Full Code    Dispo - Inpatient       [address], APRN - CNP    Thank you Lio Gutierrez MD for the opportunity to be involved in this patient's care. If you have any questions or concerns please feel free to contact me at [phone].

## 2021-12-26 NOTE — ED NOTES
Continues to await transport team possible eta 2a per access center.       Lane Grubbs RN  12/25/21 2121

## 2021-12-26 NOTE — ED NOTES
Pt resting/ [facility] Called and cancelled on scheduled transport. Access center notified.  Still awaiting transport team.      Shila Hoyos RN  12/25/21 3035

## 2021-12-26 NOTE — ED NOTES
Pt updated on admission to [facility] w room number [id number] [id number] awaiting room to be cleaned and eta for transport. Pt denies any pain sitting up in bed.       Jeanette Emdondson RN  12/25/21 1934

## 2021-12-26 NOTE — PROGRESS NOTES
Patient is resting in bed, awake and quiet. Family is at bedside. Fall precautions are in place. Call light, telephone and bedside table are within reach. Patient denies needs at present. Will continue to monitor patient per unit protocols.  Electronically signed by Nuria Young RN on 12/26/2021 at 5:08 PM

## 2021-12-26 NOTE — PROGRESS NOTES
4 Eyes Admission Assessment     I agree as the admission nurse that 2 RN's have performed a thorough Head to Toe Skin Assessment on the patient. ALL assessment sites listed below have been assessed on admission. Areas assessed by both nurses: Yes  [x]   Head, Face, and Ears   [x]   Shoulders, Back, and Chest  [x]   Arms, Elbows, and Hands   [x]   Coccyx, Sacrum, and Ischum  [x]   Legs, Feet, and Heels        Does the Patient have Skin Breakdown?   No         Todd Prevention initiated:  Yes   Wound Care Orders initiated:  NA      [facility] nurse consulted for Pressure Injury (Stage 3,4, Unstageable, DTI, NWPT, and Complex wounds):  NA      Nurse 1 eSignature: Electronically signed by Eva Hughes RN on 12/26/21 at 12:45 AM EST    **SHARE this note so that the co-signing nurse is able to place an eSignature**    Nurse 2 eSignature: Electronically signed by Kelsea Mast RN on 12/28/21 at 12:36 AM EST

## 2021-12-26 NOTE — ED NOTES
[facility] transport service eta 1130p / Access center aware. Pt updated.       Suraj Rae RN  12/25/21 Luther Rose RN  12/25/21 0599

## 2021-12-27 ENCOUNTER — APPOINTMENT (OUTPATIENT)
Dept: ULTRASOUND IMAGING | Age: 71
DRG: 872 | End: 2021-12-27
Payer: MEDICARE

## 2021-12-27 LAB
ANION GAP SERPL CALCULATED.3IONS-SCNC: 13 MMOL/L (ref 3–16)
BASOPHILS ABSOLUTE: 0 K/UL (ref 0–0.2)
BASOPHILS RELATIVE PERCENT: 0.3 %
BUN BLDV-MCNC: 9 MG/DL (ref 7–20)
CALCIUM SERPL-MCNC: 9 MG/DL (ref 8.3–10.6)
CHLORIDE BLD-SCNC: 105 MMOL/L (ref 99–110)
CO2: 20 MMOL/L (ref 21–32)
CREAT SERPL-MCNC: 0.6 MG/DL (ref 0.6–1.2)
EOSINOPHILS ABSOLUTE: 0.1 K/UL (ref 0–0.6)
EOSINOPHILS RELATIVE PERCENT: 0.5 %
GFR AFRICAN AMERICAN: >60
GFR NON-AFRICAN AMERICAN: >60
GLUCOSE BLD-MCNC: 93 MG/DL (ref 70–99)
HCT VFR BLD CALC: 40.6 % (ref 36–48)
HEMOGLOBIN: 13.1 G/DL (ref 12–16)
LYMPHOCYTES ABSOLUTE: 1.1 K/UL (ref 1–5.1)
LYMPHOCYTES RELATIVE PERCENT: 8.6 %
MCH RBC QN AUTO: 29.1 PG (ref 26–34)
MCHC RBC AUTO-ENTMCNC: 32.4 G/DL (ref 31–36)
MCV RBC AUTO: 89.9 FL (ref 80–100)
MONOCYTES ABSOLUTE: 1.3 K/UL (ref 0–1.3)
MONOCYTES RELATIVE PERCENT: 10.2 %
NEUTROPHILS ABSOLUTE: 10.4 K/UL (ref 1.7–7.7)
NEUTROPHILS RELATIVE PERCENT: 80.4 %
PDW BLD-RTO: 13.6 % (ref 12.4–15.4)
PLATELET # BLD: 161 K/UL (ref 135–450)
PMV BLD AUTO: 8.8 FL (ref 5–10.5)
POTASSIUM SERPL-SCNC: 3.6 MMOL/L (ref 3.5–5.1)
PROCALCITONIN: 2.69 NG/ML (ref 0–0.15)
RBC # BLD: 4.52 M/UL (ref 4–5.2)
SODIUM BLD-SCNC: 138 MMOL/L (ref 136–145)
URINE CULTURE, ROUTINE: NORMAL
WBC # BLD: 12.9 K/UL (ref 4–11)

## 2021-12-27 PROCEDURE — 1200000000 HC SEMI PRIVATE

## 2021-12-27 PROCEDURE — 2580000003 HC RX 258: Performed by: NURSE PRACTITIONER

## 2021-12-27 PROCEDURE — 94640 AIRWAY INHALATION TREATMENT: CPT

## 2021-12-27 PROCEDURE — 84145 PROCALCITONIN (PCT): CPT

## 2021-12-27 PROCEDURE — 99222 1ST HOSP IP/OBS MODERATE 55: CPT | Performed by: OBSTETRICS & GYNECOLOGY

## 2021-12-27 PROCEDURE — G0378 HOSPITAL OBSERVATION PER HR: HCPCS

## 2021-12-27 PROCEDURE — 6360000002 HC RX W HCPCS: Performed by: NURSE PRACTITIONER

## 2021-12-27 PROCEDURE — 96372 THER/PROPH/DIAG INJ SC/IM: CPT

## 2021-12-27 PROCEDURE — 80048 BASIC METABOLIC PNL TOTAL CA: CPT

## 2021-12-27 PROCEDURE — 36415 COLL VENOUS BLD VENIPUNCTURE: CPT

## 2021-12-27 PROCEDURE — 76856 US EXAM PELVIC COMPLETE: CPT

## 2021-12-27 PROCEDURE — 94761 N-INVAS EAR/PLS OXIMETRY MLT: CPT

## 2021-12-27 PROCEDURE — 96376 TX/PRO/DX INJ SAME DRUG ADON: CPT

## 2021-12-27 PROCEDURE — 85025 COMPLETE CBC W/AUTO DIFF WBC: CPT

## 2021-12-27 RX ADMIN — ENOXAPARIN SODIUM 40 MG: 100 INJECTION SUBCUTANEOUS at 09:14

## 2021-12-27 RX ADMIN — BUDESONIDE AND FORMOTEROL FUMARATE DIHYDRATE 2 PUFF: 80; 4.5 AEROSOL RESPIRATORY (INHALATION) at 21:54

## 2021-12-27 RX ADMIN — CEFTRIAXONE 1000 MG: 1 INJECTION, POWDER, FOR SOLUTION INTRAMUSCULAR; INTRAVENOUS at 16:15

## 2021-12-27 RX ADMIN — SODIUM CHLORIDE 25 ML: 9 INJECTION, SOLUTION INTRAVENOUS at 16:20

## 2021-12-27 RX ADMIN — BUDESONIDE AND FORMOTEROL FUMARATE DIHYDRATE 2 PUFF: 80; 4.5 AEROSOL RESPIRATORY (INHALATION) at 07:42

## 2021-12-27 ASSESSMENT — PAIN SCALES - GENERAL
PAINLEVEL_OUTOF10: 0
PAINLEVEL_OUTOF10: 0

## 2021-12-27 NOTE — PLAN OF CARE
Problem: Falls - Risk of:  Goal: Will remain free from falls  Description: Will remain free from falls  Outcome: Ongoing   Pt will keep non-skid socks on while ambulating.    Problem: Skin Integrity:  Goal: Absence of new skin breakdown  Description: Absence of new skin breakdown  Outcome: Ongoing

## 2021-12-27 NOTE — PROGRESS NOTES
Pt ambulating as tolerated in esparza. Pt returned to bed to eat dinner. No complaints of pain. Call light within reach. Able to make needs known. Fall precautions in place. Will monitor per unit protocol.      Electronically signed by Sonya Connolly RN on 12/27/2021 at 4:37 PM

## 2021-12-27 NOTE — PROGRESS NOTES
Pt A&Ox4. Pt ambulating in halls and room, returned to bed. Tolerated Lovenox injection. Pt tolerating food and drinks. Pt states having no pain. Call light within reach. Able to make needs known. Fall precautions in place. Will monitor per unit protocol.      Electronically signed by Nayely Gaviria RN on 12/27/2021 at 10:14 AM

## 2021-12-27 NOTE — PROGRESS NOTES
Hospitalist Progress Note      PCP: Lio Gutierrez MD    Date of Admission: 12/25/2021    Chief Complaint: flank pain    Hospital Course: 66 y.o. female with PMHx of Asthma and Kidney stones presented to [facility] in transfer from [place] emergency department with bilateral flank pain. Patient reports pain began 3 days ago. She has history of staghorn calculi which have required urological intervention. She has reports of fever, flank pain and slight suprapubic abdominal discomfort. She denies any dysuria or hematuria. No nausea or vomiting. Subjective: Pt seen and examined. Feels significantly better today than yesterday. Back pain significantly improved. Has no complaints today. Medications:  Reviewed    Infusion Medications    sodium chloride      sodium chloride 75 mL/hr at 12/26/21 1722     Scheduled Medications    budesonide-formoterol  2 puff Inhalation BID    sodium chloride flush  5-40 mL IntraVENous 2 times per day    enoxaparin  40 mg SubCUTAneous Daily    cefTRIAXone (ROCEPHIN) IV  1,000 mg IntraVENous Q24H     PRN Meds: sodium chloride flush, sodium chloride, ondansetron **OR** ondansetron, acetaminophen **OR** acetaminophen, polyethylene glycol      Intake/Output Summary (Last 24 hours) at 12/27/2021 1242  Last data filed at 12/27/2021 0857  Gross per 24 hour   Intake 630 ml   Output --   Net 630 ml       Exam:    BP (!) 154/84   Pulse 80   Temp 98.5 °F (36.9 °C) (Oral)   Resp 16   Ht 5' 3\" (1.6 m)   Wt 168 lb 14 oz (76.6 kg)   SpO2 95%   BMI 29.91 kg/m²     General appearance: No apparent distress, appears stated age and cooperative. HEENT: Pupils equal, round, and reactive to light. Conjunctivae/corneas clear. Neck: Supple, with full range of motion. No jugular venous distention. Trachea midline. Respiratory:  Normal respiratory effort. Clear to auscultation, bilaterally without Rales/Wheezes/Rhonchi.   Cardiovascular: Regular rate and rhythm with normal S1/S2 without murmurs, rubs or gallops. Abdomen: Soft, non-tender, non-distended with normal bowel sounds. Musculoskeletal: No clubbing, cyanosis or edema bilaterally. Full range of motion without deformity. Skin: Skin color, texture, turgor normal.  No rashes or lesions. Neurologic:  Neurovascularly intact without any focal sensory/motor deficits. Cranial nerves: II-XII intact, grossly non-focal.  Psychiatric: Alert and oriented, thought content appropriate, normal insight  Capillary Refill: Brisk,< 3 seconds   Peripheral Pulses: +2 palpable, equal bilaterally       Labs:   Recent Labs     12/25/21  1610 12/26/21  0426 12/27/21  0624   WBC 17.1* 13.5* 12.9*   HGB 14.5 12.5 13.1   HCT 42.6 36.9 40.6    125* 161     Recent Labs     12/25/21  1610 12/26/21  0426 12/27/21  0624   * 133* 138   K 3.5 3.1* 3.6   CL 95* 99 105   CO2 25 23 20*   BUN 15 13 9   CREATININE 0.8 0.7 0.6   CALCIUM 9.6 8.7 9.0     Recent Labs     12/25/21  1610 12/26/21  0426   AST 28 30   ALT 29 30   BILITOT 2.8* 2.0*   ALKPHOS 171* 163*     Recent Labs     12/26/21 0426   INR 1.35*     No results for input(s): Priyank Hatch in the last 72 hours. Urinalysis:      Lab Results   Component Value Date    NITRU POSITIVE 12/25/2021    WBCUA 21-50 12/25/2021    BACTERIA 2+ 12/25/2021    RBCUA 5-10 12/25/2021    BLOODU LARGE 12/25/2021    SPECGRAV 1.025 12/25/2021    GLUCOSEU Negative 12/25/2021       Radiology:  CT ABDOMEN PELVIS W IV CONTRAST Additional Contrast? None   Final Result   Questionable minimal inflammatory changes and fluid along the posterior   aspect of the pancreas extending inferiorly into the pararenal space which is   suspicious for possible mild subacute pancreatitis with no obvious mass or   focal fluid collection. Recommend correlating with lab values and follow-up   to assure resolution.       Questionable early inflammatory changes along the retroperitoneum extending   into the upper pelvis which could be due to the pancreatitis or possible   inflammatory changes or scarring around both kidneys extending inferiorly. Moderate stranding around both kidneys which is more prominent and could be   due to scarring or early inflammation with no hydronephrosis or renal stones. Moderate diverticulosis of the left colon which is most severe along the   sigmoid region which is unchanged with no pericolonic inflammation      Atrophic uterus with moderate hypodensity centrally which could be due to   hyperplasia or endometrial carcinoma. Recommend ultrasound follow-up      Questionable mild small-bowel ileus or less likely an early partial   obstruction distally. Recommend follow-up                 Assessment/Plan:    Active Hospital Problems    Diagnosis Date Noted    UTI (urinary tract infection) [N39.0] 12/25/2021     Sepsis ([address] on admission due to UTI  with Temp 102, , RR 18 wbc 17.1 with neutrophilia 14   -  Initial Lactic Acid 2.1 and will trend   - Pt was resuscitated with 30 cc/Kg IV Fluids and blood pressure will be monitored closely  - Continue IV fluids 100 ml/hr  - Rocephin given in ED and will continue  - Urine and Blood cultures pending  - Currently Hemodynamically stable. - CT abd/pel: Moderate stranding around both kidneys which is more prominent and could be due to scarring or early inflammation with no hydronephrosis or renal stones.     Atrophic uterus - incidentally discovered on CT. Interpretation read as hyperplasia or endometrial carcinoma  -consulted gynecology for further recommendations    Asthma  -continue home inhaler    DVT Prophylaxis: Lovenox  Diet: ADULT DIET;  Regular  Code Status: Full Code    PT/OT Eval Status: defer    Dispo - continue care, discharge home in 1-2 days    Edmundo Kimble MD

## 2021-12-27 NOTE — CONSULTS
Consults   Department of Gynecology History and Physical      CHIEF COMPLAINT: abnormal CT scan    History obtained from patient    HISTORY OF PRESENT ILLNESS:    The patient is a 66 y.o. . female with significant past medical history of severe uti who presents with abnormal CT scan showing atrophic uterus but endometrial thickening. Denies vaginal bleeding. Past Medical History:        Diagnosis Date    Asthma     Kidney stones      Past Surgical History:        Procedure Laterality Date    ELBOW FRACTURE SURGERY Right     WITH LEFT ANKLE FRACTURE SURGERY     FEMUR SURGERY Right     FRACTURE    FRACTURE SURGERY      RIGHT ELBOW AND LEFT ANKLE    HARDWARE REMOVAL      SCREW REMOVAL FROM FEMUR    IR URETERAL CATHETER OR STENT PLACEMENT  3/17/2021    IR URETERAL CATHETER OR STENT PLACEMENT 3/17/2021 [facility]    JOINT REPLACEMENT Left     KIDNEY REMOVAL Right 3/17/2021    RIGHT PERCUTANEOUS NEPHROLITHOTOMY, CYSTOSCOPY performed by Hung Shore DO at [facility]        Past Gynecological History:    1. Last menstrual period:  20 + years  2. Menses:n/a  3. Contraception: vasectomy  4. Sexually transmitted disease history: none  5.  Number of sexual partners in the last 6 months: 0    Meds:  Current Facility-Administered Medications:     budesonide-formoterol (SYMBICORT) 80-4.5 MCG/ACT inhaler 2 puff, 2 puff, Inhalation, BID, Melva Other, APRN - CNP, 2 puff at 21 0742    sodium chloride flush 0.9 % injection 5-40 mL, 5-40 mL, IntraVENous, 2 times per day, Melva Other, APRN - CNP, 10 mL at 21 2152    sodium chloride flush 0.9 % injection 5-40 mL, 5-40 mL, IntraVENous, PRN, Melva Other, APRN - CNP    0.9 % sodium chloride infusion, 25 mL, IntraVENous, PRN, Melva Other, APRN - CNP    enoxaparin (LOVENOX) injection 40 mg, 40 mg, SubCUTAneous, Daily, Melva Other, APRN - CNP, 40 mg at 21 0914    ondansetron (ZOFRAN-ODT) disintegrating tablet 4 mg, 4 mg, Oral, Q8H PRN **OR** ondansetron (ZOFRAN) injection 4 mg, 4 mg, IntraVENous, Q6H PRN, Switzer Crumbly, APRN - CNP, 4 mg at 12/26/21 1735    acetaminophen (TYLENOL) tablet 650 mg, 650 mg, Oral, Q6H PRN **OR** acetaminophen (TYLENOL) suppository 650 mg, 650 mg, Rectal, Q6H PRN, Switzer Crumbly, APRN - CNP    polyethylene glycol (GLYCOLAX) packet 17 g, 17 g, Oral, Daily PRN, Switzer Crumbly, APRN - CNP    0.9 % sodium chloride infusion, , IntraVENous, Continuous, Long Crumbly, APRN - CNP, Last Rate: 75 mL/hr at 12/26/21 1722, New Bag at 12/26/21 1722    cefTRIAXone (ROCEPHIN) 1000 mg IVPB in 50 mL D5W minibag, 1,000 mg, IntraVENous, Q24H, Long Crumbly, APRN - CNP, Stopped at 12/26/21 1735     Allergies:  Patient has no known allergies.      Social History:  TOBACCO:  none  ETOH:  none  DRUGS:  denies    Family History:       Problem Relation Age of Onset    Breast Cancer Mother     Lupus Mother     Tuberculosis Mother     Heart Failure Father     Emphysema Sister     Cancer Brother        ROS:     General ROS: negative  Respiratory ROS: no cough, SOB or wheezing  Cardiovascular ROS: no chest pain or dyspnea on exertion  Gastrointestinal ROS: no abdominal pain, change in bowel habits, or black or bloody stools  Genito-Urinary ROS: positive for - dysuria  no dysuria, trouble of voiding or hematuria  Musculoskeletal ROS: negative    PHYSICAL EXAM:    Vitals:  BP (!) 154/84   Pulse 80   Temp 98.5 °F (36.9 °C) (Oral)   Resp 16   Ht 5' 3\" (1.6 m)   Wt 168 lb 14 oz (76.6 kg)   SpO2 95%   BMI 29.91 kg/m²     CONSTITUTIONAL:  awake, alert, cooperative, no apparent distress, and appears stated age  NECK:  Supple, symmetrical, trachea midline, no adenopathy, thyroid symmetric, not enlarged and no tenderness, skin normal  HEMATOLOGIC/LYMPHATICS:  no cervical lymphadenopathy  BACK:  symmetric  LUNGS:  No increased work of breathing, good air exchange  CARDIOVASCULAR: No periferal edema  ABDOMEN: Soft, non-distended, non-tender, no masses palpated, no hepatosplenomegally  CHEST/BREASTS:  Breasts symmetrical, skin without lesion(s), no nipple retraction or dimpling, no nipple discharge, no masses palpated, no axillary or supraclavicular adenopathy  MUSCULOSKELETAL:  tone is normal  NEUROLOGIC:  Awake, alert, oriented to name, place and time. SKIN:  normal skin color, texture, turgor  External Genitalia: def  Urethral Meatus: def  Urethra: def  Bladder: def  Vagina: def  Cervix: def  Uterus: def  Adenexa: def    Labs:    CBC:   Lab Results   Component Value Date    WBC 12.9 12/27/2021    RBC 4.52 12/27/2021    HGB 13.1 12/27/2021    HCT 40.6 12/27/2021    MCV 89.9 12/27/2021    RDW 13.6 12/27/2021     12/27/2021     BMP:    Lab Results   Component Value Date     12/27/2021    K 3.6 12/27/2021    K 3.1 12/26/2021     12/27/2021    CO2 20 12/27/2021    BUN 9 12/27/2021     HFP:    Lab Results   Component Value Date    PROT 6.3 12/26/2021     CMP:    Lab Results   Component Value Date     12/27/2021    K 3.6 12/27/2021    K 3.1 12/26/2021     12/27/2021    CO2 20 12/27/2021    BUN 9 12/27/2021    PROT 6.3 12/26/2021     FLP:  No results found for: CHOL, TRIG, HDL  U/A:  No components found for: Luisa Hails, USPGRAV, UPH, UPROTEIN, UGLUCOSE, UKETONE, UBILI, UBLOOD, UNITRITE, UUROBIL, [place], [facility], [place], [facility], [place], [place], [place]  Imaging: CT     EXAMINATION:   CT OF THE ABDOMEN AND PELVIS WITH CONTRAST 12/25/2021 5:06 pm       TECHNIQUE:   CT of the abdomen and pelvis was performed with the administration of   intravenous contrast. Multiplanar reformatted images are provided for review.    Dose modulation, iterative reconstruction, and/or weight based adjustment of   the mA/kV was utilized to reduce the radiation dose to as low as reasonably   achievable.       COMPARISON:   11/07/2020       HISTORY:   ORDERING SYSTEM PROVIDED HISTORY: b/l flank pain TECHNOLOGIST PROVIDED HISTORY:   Reason for exam:->b/l flank pain   Additional Contrast?->None   Decision Support Exception - unselect if not a suspected or confirmed   emergency medical condition->Emergency Medical Condition (MA)   Reason for Exam: bilateral flank pain fever uti sx. Relevant Medical/Surgical History: hx staghorn stone removal from right kidney       FINDINGS:   Lower Chest: There mild linear densities along the lung bases anteriorly   which are unchanged.       Organs: The liver is mildly enlarged with hypertrophy of the caudate and left   lobes and fatty replacement throughout which is more prominent. Robertha Isiah is a   tiny 5 mm hypodensity along the right lobe inferiorly which is unchanged. The gallbladder is unremarkable with slight prominence of the central biliary   ducts and common bile duct which tapers distally and is unchanged.  The   pancreas is normal size with some minimal stranding and fluid in the   peripancreatic soft tissues posteriorly along the body of the pancreas which   is more apparent. Robertha Iisah is no focal fluid collection or mass. Robertha Isiah is some   minimal stranding and thickening of the pararenal fascia extending inferiorly   into the upper pelvis along the retroperitoneum with no focal fluid   collection or mass. Robertha Isiah is prominent stranding around both kidneys which   is more prominent with no hydronephrosis or renal stones.       GI/Bowel:   There is some mildly dilated loops of small bowel along the lower   abdomen.  The appendix is normal.  There are diverticula throughout the   transverse and left colon with most severe along the sigmoid region which is   unchanged with no pericolonic inflammation.       Pelvis:  The uterus is atrophic with hypodensity and thickening of the   endometrium which is more apparent.  The bladder is unremarkable. Robertha Isiah is   no adnexal or ovarian mass and no no significant free fluid is seen       Peritoneum/Retroperitoneum: There is moderate calcified plaque throughout the   aorta with a which is mildly dilated throughout with no aneurysm or   dissection.  No retroperitoneal mass or adenopathy is seen       Bones/Soft Tissues:   The bones are intact           Impression   Questionable minimal inflammatory changes and fluid along the posterior   aspect of the pancreas extending inferiorly into the pararenal space which is   suspicious for possible mild subacute pancreatitis with no obvious mass or   focal fluid collection.  Recommend correlating with lab values and follow-up   to assure resolution.       Questionable early inflammatory changes along the retroperitoneum extending   into the upper pelvis which could be due to the pancreatitis or possible   inflammatory changes or scarring around both kidneys extending inferiorly.       Moderate stranding around both kidneys which is more prominent and could be   due to scarring or early inflammation with no hydronephrosis or renal stones.       Moderate diverticulosis of the left colon which is most severe along the   sigmoid region which is unchanged with no pericolonic inflammation       Atrophic uterus with moderate hypodensity centrally which could be due to   hyperplasia or endometrial carcinoma.  Recommend ultrasound follow-up       Questionable mild small-bowel ileus or less likely an early partial   obstruction distally.  Recommend follow-up           ASSESSMENT AND PLAN:  Dysuria. Atrophic uterus with moderate hypodensity. Will check pelvic US. Could be endometrial polyp. Will follow. 55 min >50% of time was spent discussing findings, management, and treatment options. <principal problem not specified>       I have presented reasonable alternatives to the patient's proposed care, treatment, and services.  The discussion I have done encompassed risks, benefits, and side effects related to the alternatives and the risks related to not receiving the proposed care, treatment, and services. All questions answered. Patient wishes to proceed. The surgical site was confirmed by the patient and me.      Electronically signed by Itzel Berger MD on 63/90/2935 at 3:56 PM

## 2021-12-28 VITALS
DIASTOLIC BLOOD PRESSURE: 94 MMHG | RESPIRATION RATE: 18 BRPM | OXYGEN SATURATION: 96 % | HEART RATE: 75 BPM | BODY MASS INDEX: 29.92 KG/M2 | WEIGHT: 168.87 LBS | HEIGHT: 63 IN | SYSTOLIC BLOOD PRESSURE: 171 MMHG | TEMPERATURE: 98.1 F

## 2021-12-28 LAB
A/G RATIO: 0.8 (ref 1.1–2.2)
ALBUMIN SERPL-MCNC: 2.6 G/DL (ref 3.4–5)
ALP BLD-CCNC: 215 U/L (ref 40–129)
ALT SERPL-CCNC: 62 U/L (ref 10–40)
ANION GAP SERPL CALCULATED.3IONS-SCNC: 9 MMOL/L (ref 3–16)
AST SERPL-CCNC: 53 U/L (ref 15–37)
BASOPHILS ABSOLUTE: 0 K/UL (ref 0–0.2)
BASOPHILS RELATIVE PERCENT: 0.2 %
BILIRUB SERPL-MCNC: 0.9 MG/DL (ref 0–1)
BUN BLDV-MCNC: 8 MG/DL (ref 7–20)
CALCIUM SERPL-MCNC: 8.6 MG/DL (ref 8.3–10.6)
CHLORIDE BLD-SCNC: 103 MMOL/L (ref 99–110)
CO2: 26 MMOL/L (ref 21–32)
CREAT SERPL-MCNC: 0.6 MG/DL (ref 0.6–1.2)
EOSINOPHILS ABSOLUTE: 0.1 K/UL (ref 0–0.6)
EOSINOPHILS RELATIVE PERCENT: 1 %
GFR AFRICAN AMERICAN: >60
GFR NON-AFRICAN AMERICAN: >60
GLUCOSE BLD-MCNC: 119 MG/DL (ref 70–99)
HCT VFR BLD CALC: 34.9 % (ref 36–48)
HEMOGLOBIN: 11.6 G/DL (ref 12–16)
LYMPHOCYTES ABSOLUTE: 0.9 K/UL (ref 1–5.1)
LYMPHOCYTES RELATIVE PERCENT: 10.3 %
MAGNESIUM: 2 MG/DL (ref 1.8–2.4)
MCH RBC QN AUTO: 29.8 PG (ref 26–34)
MCHC RBC AUTO-ENTMCNC: 33.1 G/DL (ref 31–36)
MCV RBC AUTO: 89.8 FL (ref 80–100)
MONOCYTES ABSOLUTE: 1 K/UL (ref 0–1.3)
MONOCYTES RELATIVE PERCENT: 11.5 %
NEUTROPHILS ABSOLUTE: 6.9 K/UL (ref 1.7–7.7)
NEUTROPHILS RELATIVE PERCENT: 77 %
PDW BLD-RTO: 13.4 % (ref 12.4–15.4)
PLATELET # BLD: 158 K/UL (ref 135–450)
PMV BLD AUTO: 8.6 FL (ref 5–10.5)
POTASSIUM REFLEX MAGNESIUM: 3 MMOL/L (ref 3.5–5.1)
PROCALCITONIN: 1.42 NG/ML (ref 0–0.15)
RBC # BLD: 3.88 M/UL (ref 4–5.2)
SODIUM BLD-SCNC: 138 MMOL/L (ref 136–145)
TOTAL PROTEIN: 6 G/DL (ref 6.4–8.2)
WBC # BLD: 9 K/UL (ref 4–11)

## 2021-12-28 PROCEDURE — 6360000002 HC RX W HCPCS: Performed by: NURSE PRACTITIONER

## 2021-12-28 PROCEDURE — 36415 COLL VENOUS BLD VENIPUNCTURE: CPT

## 2021-12-28 PROCEDURE — 94640 AIRWAY INHALATION TREATMENT: CPT

## 2021-12-28 PROCEDURE — G0378 HOSPITAL OBSERVATION PER HR: HCPCS

## 2021-12-28 PROCEDURE — 2580000003 HC RX 258: Performed by: NURSE PRACTITIONER

## 2021-12-28 PROCEDURE — 6370000000 HC RX 637 (ALT 250 FOR IP): Performed by: INTERNAL MEDICINE

## 2021-12-28 PROCEDURE — 96372 THER/PROPH/DIAG INJ SC/IM: CPT

## 2021-12-28 PROCEDURE — 85025 COMPLETE CBC W/AUTO DIFF WBC: CPT

## 2021-12-28 PROCEDURE — 94760 N-INVAS EAR/PLS OXIMETRY 1: CPT

## 2021-12-28 PROCEDURE — 84145 PROCALCITONIN (PCT): CPT

## 2021-12-28 PROCEDURE — 83735 ASSAY OF MAGNESIUM: CPT

## 2021-12-28 PROCEDURE — 80053 COMPREHEN METABOLIC PANEL: CPT

## 2021-12-28 RX ORDER — CEFDINIR 300 MG/1
300 CAPSULE ORAL 2 TIMES DAILY
Qty: 10 CAPSULE | Refills: 0 | Status: SHIPPED | OUTPATIENT
Start: 2021-12-28 | End: 2022-01-02

## 2021-12-28 RX ORDER — GREEN TEA/HOODIA GORDONII 315-12.5MG
1 CAPSULE ORAL 2 TIMES DAILY
Qty: 10 TABLET | Refills: 0 | Status: SHIPPED | OUTPATIENT
Start: 2021-12-28 | End: 2022-01-02

## 2021-12-28 RX ORDER — POTASSIUM CHLORIDE 20 MEQ/1
40 TABLET, EXTENDED RELEASE ORAL ONCE
Status: COMPLETED | OUTPATIENT
Start: 2021-12-28 | End: 2021-12-28

## 2021-12-28 RX ADMIN — ENOXAPARIN SODIUM 40 MG: 100 INJECTION SUBCUTANEOUS at 08:08

## 2021-12-28 RX ADMIN — SODIUM CHLORIDE, PRESERVATIVE FREE 10 ML: 5 INJECTION INTRAVENOUS at 08:09

## 2021-12-28 RX ADMIN — BUDESONIDE AND FORMOTEROL FUMARATE DIHYDRATE 2 PUFF: 80; 4.5 AEROSOL RESPIRATORY (INHALATION) at 08:32

## 2021-12-28 RX ADMIN — POTASSIUM CHLORIDE 40 MEQ: 1500 TABLET, EXTENDED RELEASE ORAL at 08:19

## 2021-12-28 ASSESSMENT — PAIN SCALES - GENERAL: PAINLEVEL_OUTOF10: 0

## 2021-12-28 NOTE — PLAN OF CARE
Problem: Falls - Risk of:  Goal: Will remain free from falls  Description: Will remain free from falls  Outcome: Ongoing  Note: Patient educated on fall prevention. Call light is within reach, bed locked in lowest position, personal items within reach, and bed alarm is on. Will round on patient per unit guidelines. Goal: Absence of physical injury  Description: Absence of physical injury  Outcome: Ongoing  Note: Pt assessed for fall risk and fall precautions put into place. Bed in lowest position and wheels locked, call light within reach. Nonskid footwear in place. Patient educated on appropriate method of transfer and to call for assistance. Problem: Skin Integrity:  Goal: Will show no infection signs and symptoms  Description: Will show no infection signs and symptoms  Outcome: Ongoing  Note: Will monitor skin and mucous members. Will turn patient every 2 hours, monitor for friction and sheering, and change dressings as needed. Will preform skin assessment every shift. Goal: Absence of new skin breakdown  Description: Absence of new skin breakdown  Outcome: Ongoing  Note: Pt assessed for fall risk and fall precautions put into place. Bed in lowest position and wheels locked, call light within reach. Nonskid footwear in place. Patient educated on appropriate method of transfer and to call for assistance.

## 2021-12-28 NOTE — PROGRESS NOTES
Pt awake and ambulating in room as tolerated. A&Ox4. Tolerated PO meds with water. No complaints of pain. Able to make needs known. Call light within reach. Fall precautions in place. Will monitor per unit protocol.      Electronically signed by Bob Joaquin RN on 12/28/2021 at 9:56 AM

## 2021-12-28 NOTE — DISCHARGE SUMMARY
Hospital Medicine Discharge Summary    Patient ID: Rowyd Marmolejo      Patient's PCP: Peter Araujo MD    Admit Date: 12/25/2021     Discharge Date:   12/28/2021    Admitting Physician: Bhargavi Mallory MD     Discharge Physician: Elvin Renteria MD     Discharge Diagnoses: Active Hospital Problems    Diagnosis Date Noted    Pyelonephritis [N12]     UTI (urinary tract infection) [N39.0] 12/25/2021       The patient was seen and examined on day of discharge and this discharge summary is in conjunction with any daily progress note from day of discharge. Hospital Course: 66 y.o. female with PMHx of Asthma and Kidney stones presented to [facility] in transfer from [place] emergency department with bilateral flank pain. Patient reports pain began 3 days ago. She has history of staghorn calculi which have required urological intervention. She has reports of fever, flank pain and slight suprapubic abdominal discomfort. She denies any dysuria or hematuria. No nausea or vomiting. Sepsis ([address] on admission due to UTI  with Temp 102, , RR 18 wbc 17.1 with neutrophilia 14   -  Initial Lactic Acid 2.1 and will trend   - Pt was resuscitated with 30 cc/Kg IV Fluids and blood pressure will be monitored closely  - Continue IV fluids 100 ml/hr  - Rocephin given in ED and will continue -> transition to PO Omnicef for 5 days to complete a 7 day course  - Urine and Blood cultures unremarkable  - Currently Hemodynamically stable. - CT abd/pel: Moderate stranding around both kidneys which is more prominent and could be due to scarring or early inflammation with no hydronephrosis or renal stones.     Atrophic uterus - incidentally discovered on CT.  Interpretation read as hyperplasia or endometrial carcinoma  -consulted gynecology for further recommendations  -Pelvic ultrasound was an incomplete evaluation and recommend to follow-up with gynecology as an outpatient for further evaluation     Asthma  -continue home inhaler      Exam:     BP (!) 171/94   Pulse 75   Temp 98.1 °F (36.7 °C) (Oral)   Resp 18   Ht 5' 3\" (1.6 m)   Wt 168 lb 14 oz (76.6 kg)   SpO2 96%   BMI 29.91 kg/m²       General appearance:  No apparent distress, appears stated age and cooperative. HEENT:  Normal cephalic, atraumatic without obvious deformity. Pupils equal, round, and reactive to light. Extra ocular muscles intact. Conjunctivae/corneas clear. Neck: Supple, with full range of motion. No jugular venous distention. Trachea midline. Respiratory:  Normal respiratory effort. Clear to auscultation, bilaterally without Rales/Wheezes/Rhonchi. Cardiovascular:  Regular rate and rhythm with normal S1/S2 without murmurs, rubs or gallops. Abdomen: Soft, non-tender, non-distended with normal bowel sounds. Musculoskeletal:  No clubbing, cyanosis or edema bilaterally. Full range of motion without deformity. Skin: Skin color, texture, turgor normal.  No rashes or lesions. Neurologic:  Neurovascularly intact without any focal sensory/motor deficits. Cranial nerves: II-XII intact, grossly non-focal.  Psychiatric:  Alert and oriented, thought content appropriate, normal insight  Capillary Refill: Brisk,< 3 seconds   Peripheral Pulses: +2 palpable, equal bilaterally       Labs: For convenience and continuity at follow-up the following most recent labs are provided:      CBC:    Lab Results   Component Value Date    WBC 9.0 12/28/2021    HGB 11.6 12/28/2021    HCT 34.9 12/28/2021     12/28/2021       Renal:    Lab Results   Component Value Date     12/28/2021    K 3.0 12/28/2021     12/28/2021    CO2 26 12/28/2021    BUN 8 12/28/2021    CREATININE 0.6 12/28/2021    CALCIUM 8.6 12/28/2021         Significant Diagnostic Studies    Radiology:   US PELVIS COMPLETE   Final Result   Endometrial thickening is incompletely evaluated. When feasible, follow-up   transvaginal exam is recommended. Endometrial hyperplasia or neoplasm are   concerns, based on CT. RECOMMENDATIONS:   Unavailable         CT ABDOMEN PELVIS W IV CONTRAST Additional Contrast? None   Final Result   Questionable minimal inflammatory changes and fluid along the posterior   aspect of the pancreas extending inferiorly into the pararenal space which is   suspicious for possible mild subacute pancreatitis with no obvious mass or   focal fluid collection. Recommend correlating with lab values and follow-up   to assure resolution. Questionable early inflammatory changes along the retroperitoneum extending   into the upper pelvis which could be due to the pancreatitis or possible   inflammatory changes or scarring around both kidneys extending inferiorly. Moderate stranding around both kidneys which is more prominent and could be   due to scarring or early inflammation with no hydronephrosis or renal stones. Moderate diverticulosis of the left colon which is most severe along the   sigmoid region which is unchanged with no pericolonic inflammation      Atrophic uterus with moderate hypodensity centrally which could be due to   hyperplasia or endometrial carcinoma. Recommend ultrasound follow-up      Questionable mild small-bowel ileus or less likely an early partial   obstruction distally.   Recommend follow-up                Consults:     IP CONSULT TO HOSPITALIST  IP CONSULT TO OB GYN    Disposition:  home     Condition at Discharge: Stable    Discharge Instructions/Follow-up:  meds as prescribed, follow-up with gynecology, PCP    Code Status:  Full Code     Activity: activity as tolerated    Diet: regular diet      Discharge Medications:     Current Discharge Medication List           Details   cefdinir (OMNICEF) 300 MG capsule Take 1 capsule by mouth 2 times daily for 5 days  Qty: 10 capsule, Refills: 0      Probiotic Acidophilus (FLORANEX) TABS Take 1 tablet by mouth 2 times daily for 5 days  Qty: 10 tablet, Refills: 0              Details   fluticasone-salmeterol (ADVAIR) 100-50 MCG/DOSE diskus inhaler Inhale 1 puff into the lungs 2 times daily             Time Spent on discharge is more than 30 minutes in the examination, evaluation, counseling and review of medications and discharge plan. Signed:    Tushar Steiner MD   12/28/2021      Thank you Kerrie Briggs MD for the opportunity to be involved in this patient's care. If you have any questions or concerns please feel free to contact me at [phone].

## 2021-12-28 NOTE — PROGRESS NOTES
Data- discharge order received, pt verbalized agreement to discharge, disposition to previous residence, no needs for HHC/DME. Action- discharge instructions prepared and given to pt, pt verbalized understanding. Medication information packet given r/t NEW and/or CHANGED prescriptions emphasizing name/purpose/side effects, pt verbalized understanding. Discharge instruction summary: Diet- Regular, Activity- UAL, Primary Care Physician as follows: Fernando Jackman -[phone] f/u appointment, immunizations reviewed and  prescription medications filled with pts pharmacy. Reviewed discharge paperwork with pt. No further questions. Response- Pt belongings gathered, IV removed, dressing applied. Disposition is home (no HHC/DME needs), transported with family, taken to lobby via w/c w/ belongings and D/C instructions, no complications.    Electronically signed by Lolis Espinoza RN on 12/28/2021 at 11:33 AM

## 2021-12-28 NOTE — PLAN OF CARE
Problem: Falls - Risk of:  Goal: Will remain free from falls  Description: Will remain free from falls  12/28/2021 0745 by Jenny Martin RN  Outcome: Ongoing   Pt will continue wearing gripper socks. Bed is locked and in lowest position. Call light within reach. Problem: Falls - Risk of:  Goal: Absence of physical injury  Description: Absence of physical injury  12/28/2021 0745 by Jenny Martin RN  Outcome: Ongoing   Pt educated on risk for falls and the precautions in place.

## 2021-12-28 NOTE — DISCHARGE INSTR - DIET

## 2021-12-30 LAB
BLOOD CULTURE, ROUTINE: NORMAL
CULTURE, BLOOD 2: NORMAL

## 2022-01-24 PROBLEM — N39.0 UTI (URINARY TRACT INFECTION): Status: RESOLVED | Noted: 2021-12-25 | Resolved: 2022-01-24

## 2022-12-19 ENCOUNTER — HOSPITAL ENCOUNTER (OUTPATIENT)
Dept: WOMENS IMAGING | Age: 72
Discharge: HOME | End: 2022-12-19
Payer: MEDICARE

## 2022-12-19 DIAGNOSIS — Z12.31 BREAST CANCER SCREENING BY MAMMOGRAM: ICD-10-CM

## 2022-12-19 PROCEDURE — 77067 SCR MAMMO BI INCL CAD: CPT

## 2023-01-23 ENCOUNTER — HOSPITAL ENCOUNTER (OUTPATIENT)
Dept: WOMENS IMAGING | Age: 73
Discharge: HOME | End: 2023-01-23
Payer: MEDICARE

## 2023-01-23 ENCOUNTER — HOSPITAL ENCOUNTER (OUTPATIENT)
Dept: ULTRASOUND IMAGING | Age: 73
Discharge: HOME | End: 2023-01-23
Payer: MEDICARE

## 2023-01-23 DIAGNOSIS — R93.89 ABNORMAL ULTRASOUND: ICD-10-CM

## 2023-01-23 DIAGNOSIS — R92.8 ABNORMAL MAMMOGRAM: ICD-10-CM

## 2023-01-23 PROCEDURE — G0279 TOMOSYNTHESIS, MAMMO: HCPCS

## 2023-01-23 PROCEDURE — 76642 ULTRASOUND BREAST LIMITED: CPT

## 2023-01-23 NOTE — DISCHARGE INSTRUCTIONS
[address]     [address]  [phone]         ULTRASOUND BIOPSY EDUCATION    NAME:  [place]   YOB: 1950   MEDICAL RECORD NUMBER:  [id number]   TODAY'S DATE:  @ED@    What is an Ultrasound Guided Breast Biopsy? Ultrasound guided breast biopsy is a test that uses ultrasound to find an area of your breast where a tissue sample will be taken. The sample is then looked at under a microscope to check for signs of breast cancer. Why is it done? An Ultrasound biopsy is usually done to check for cancer in a lump or cyst found during a mammogram or ultrasound. Preparing for the test?     * Take your medications as prescribed    You may eat and drink fluids before the test    Take a shower the evening or morning before the biopsy. What happens before the test?  Images are taken to find the exact site to be biopsied. Your skin is washed with an alcohol prep. You will be given an injection of medication to numb your breast.   What happens during the test?     Once your breast is numb, a small cut (incision) is made. Using the imaging, the doctor will guide the needle into the biopsy area. A sample of breast tissue is taken through the needle. A small \"Clip\" or Marker is inserted into your breast to glen the biopsy site. The needle is removed and pressure put on the needle site to stop any bleeding. A bandage will be placed over the site. A post mammogram picture will be taken to document the clip placement. How long does the test take? Approximately 60 minutes. Most of the time is spent finding the area for the biopsy. What are the risks? Bleeding: You may have some bleeding which can cause bruising, swelling,    or a bleeding under your skin. Infection:  Signs of infection are redness, swelling, heat, or increasing pain    at the biopsy Site.         Sample size not adequate: This would require repeating the biopsy. What happens after the test?    The nurse will review your post biopsy instructions which include:         Placing an ice pack in your bra. Wearing a firm fitting bra. You may use Tylenol (Acetaminiphen) for discomfort. Lab results take about 2-3 business days. The Nurse Navigator or your doctor will call you with the results. Ultrasound Breast Biopsy:     (Please arrive 20 minutes early)                                                 [x] Blood thinner history reviewed with patient    [x] Take all your other medications on your normal routine schedule. [x] You may eat and drink as normal before your biopsy. [x] You may drive yourself. [x] Take a shower the night before or the morning of the biopsy. [x] Wear a bra in order to hold ice pack in place after the biopsy. [x] No heavy lifting or exercise for 48 hours after the biopsy. [x] Printed Pre Ultrasound Biopsy Instructions were provided, reviewed with the patient and all questions were answered. [x] A list of [facility] has been provided to the patient. [facility] Information: [address] 8:00 am - 4:30 pm.   Should you experience any significant changes in your health or have questions about your care, please contact the [facility] at [phone]      If you need help with your care outside these hours and cannot wait until the next business day,  contact your Physician or go to the hospital emergency room. [x] Patient/POA/Caregiver verbalized understanding of Discharge Instructions.        Electronically signed by Norah Winters RN on 1/23/2023 at 8:57 AM

## 2023-01-23 NOTE — PROGRESS NOTES
Dr. Rony Alva spoke to patient regarding recommendation for breast biopsy. RN and patient discussed medical history, allergies, and current medication list. Biopsy procedure explained to patient and printed education and instructions were provided as well. Patient denies any further questions. Requesting an order for biopsy from referring MD at this time. Biopsy order form, along with the radiologist's report, were faxed to doctor's office.

## 2023-01-30 ENCOUNTER — HOSPITAL ENCOUNTER (OUTPATIENT)
Dept: ULTRASOUND IMAGING | Age: 73
Discharge: HOME | End: 2023-01-30
Payer: MEDICARE

## 2023-01-30 ENCOUNTER — HOSPITAL ENCOUNTER (OUTPATIENT)
Dept: WOMENS IMAGING | Age: 73
Discharge: HOME | End: 2023-01-30
Payer: MEDICARE

## 2023-01-30 VITALS — HEART RATE: 83 BPM | OXYGEN SATURATION: 94 % | DIASTOLIC BLOOD PRESSURE: 47 MMHG | SYSTOLIC BLOOD PRESSURE: 97 MMHG

## 2023-01-30 DIAGNOSIS — N63.10 MASS OF RIGHT BREAST, UNSPECIFIED QUADRANT: ICD-10-CM

## 2023-01-30 DIAGNOSIS — R92.8 ABNORMAL MAMMOGRAM: ICD-10-CM

## 2023-01-30 PROCEDURE — 19084 BX BREAST ADD LESION US IMAG: CPT

## 2023-01-30 PROCEDURE — 88342 IMHCHEM/IMCYTCHM 1ST ANTB: CPT

## 2023-01-30 PROCEDURE — 88341 IMHCHEM/IMCYTCHM EA ADD ANTB: CPT

## 2023-01-30 PROCEDURE — 19083 BX BREAST 1ST LESION US IMAG: CPT

## 2023-01-30 PROCEDURE — G0279 TOMOSYNTHESIS, MAMMO: HCPCS

## 2023-01-30 PROCEDURE — 88360 TUMOR IMMUNOHISTOCHEM/MANUAL: CPT

## 2023-01-30 PROCEDURE — 88305 TISSUE EXAM BY PATHOLOGIST: CPT

## 2023-01-30 ASSESSMENT — PAIN SCALES - GENERAL
PAINLEVEL_OUTOF10: 0
PAINLEVEL_OUTOF10: 0

## 2023-01-30 NOTE — PROGRESS NOTES
Breast Biopsy Nursing Note    NAME:  Tita Sinclair  YOB: 1950 GENDER: female  MEDICAL RECORD NUMBER:  [id number]  DATE:  1/30/2023    Breast Biopsy completed by Kory Mills. Expiration date site marker checked immediately prior to use. Package intact prior to use and no damage noted. Site marker was removed from protective sterile packaging by physician. Site marker was placed by physician into right     breast/s. Hemostasis achieved via site pressure; Biopsy site cleansed with alcohol  Steri-strips applied along with small amount of bacitracin-neomycin polymixin then 2X2 tegaderm   Breast Biopsy tissue was placed in proper container/s and labeled. Breast Biopsy tissue was taken to Pathology for evaluation. Procedural Pain:  0  / 10     Post Procedural Pain:  0 / 10     Procedure well tolerated. Pt stable and alert and oriented x 3 upon discharge. Ice pack placed over biopsy site. Pt given post-biopsy education and all questions answered. Pt has NN contact info.        Electronically signed by Millicent Vazquez RN on 1/30/2023 at 2:29 PM

## 2023-01-30 NOTE — DISCHARGE INSTRUCTIONS
[facility] and Discharge [facility]   [address] [address]  Telephone: [phone]    NAME:  Suyapa Gallegos:  1950  GENDER: female  MEDICAL RECORD NUMBER:  [id number]  TODAY'S DATE:  @ED@    Discharge Instructions [facility]:    Post Breast Biopsy Instructions    [x] Place the provided cold pack inside your bra on top of the dressing for at least 3 hours. You may re-freeze it and use it again later if you have any discomfort. [x] You may take Tylenol (Acetaminophen) the day of your biopsy for any discomfort. [x] Watch for excessive bleeding. If bleeding occurs apply pressure to site. Watch for signs of infection, increased pain, redness, swelling and heat. If this occurs call your physician. [x] Do not participate in any strenuous exercise for 48 hours after your biopsy, such as tennis, aerobics, weight lifting and household activities that involve use of your affected   arm. [x] You may remove your outer dressing in 24 hours and you may shower. \"Steri-strips\" tape will stay on for a few days longer then can be removed. [facility] Information:   Should you experience any significant changes in your health or have questions about your care, please contact:  [facility] [email or web address]   Monday-Friday 8:00 am - 4:30 pm.  If you need help with your care outside these hours and cannot wait until we are again available,  contact your Physician or go to the hospital emergency.        Electronically signed by Jayne Alcantar RN on 1/30/2023 at 1:04 PM

## 2023-01-31 ENCOUNTER — HOSPITAL ENCOUNTER (OUTPATIENT)
Dept: CT IMAGING | Age: 73
Discharge: HOME | End: 2023-01-31
Payer: MEDICARE

## 2023-01-31 ENCOUNTER — TRANSCRIBE ORDERS (OUTPATIENT)
Dept: ADMINISTRATIVE | Age: 73
End: 2023-01-31

## 2023-01-31 DIAGNOSIS — N20.1 CALCULUS OF URETER: ICD-10-CM

## 2023-01-31 DIAGNOSIS — N20.1 CALCULUS OF URETER: Primary | ICD-10-CM

## 2023-01-31 PROCEDURE — 74176 CT ABD & PELVIS W/O CONTRAST: CPT

## 2023-02-01 ENCOUNTER — CASE MANAGEMENT (OUTPATIENT)
Dept: WOMENS IMAGING | Age: 73
End: 2023-02-01

## 2023-02-01 NOTE — PROGRESS NOTES
Pt informed of Papillary Carcinoma recommended follow up is breast surgical consultation and excisional biopsy. Jovon Sweeney knows Dr Bel Simpson office will call to schedule a consultation. Pt agrees with plan.

## 2023-02-01 NOTE — PROGRESS NOTES
[facility] [place] regarding breast biopsy pathology and recommendations.

## 2023-02-07 ASSESSMENT — ENCOUNTER SYMPTOMS
EYES NEGATIVE: 1
GASTROINTESTINAL NEGATIVE: 1
RESPIRATORY NEGATIVE: 1

## 2023-02-07 NOTE — PROGRESS NOTES
PCP:  Medical Oncology:  Radiation:  Other:      [id number]:  IA right breast cancer  (In situ versus invasive carcinoma based on final pathology)    HPI:  Ms. Chrissy Vigil is a 74 y. o. woman who presents today with a new diagnosis of grade 2, right sided  papillary carcinoma . ER + WI+ HER2 negative. She states that she has not noticed any new abnormalities such as masses, skin changes, color changes, nipple discharge, or changes to the nipple-areolar complex. Prior to this she has never had breast related problems and has never required a breast biopsy. She has no family history of breast or ovarian cancer. She is here today in initial consultation to discuss her recent breast diagnosis. She was referred by [facility]. INTERVAL HISTORY:    On 1/23/2023 she was recalled from screening mammogram for bilateral breast asymmetries. Diagnostic imaging of the left breast identified a benign-appearing cyst.  There is a 1.1 cm suspicious appearing mass in the 10 o'clock position of the right breast.  Additional 7 mm mass noted at 11:00 is also recommended for biopsy BI-RADS 4. On 1/30/2023 she underwent 2 site core needle biopsy of the right breast.  Pathology was considered concordant. [id number]    Pathology of the right breast 11 o'clock position identified benign changes with no sign of atypia or malignancy. Pathology of the right breast 10 o'clock position identified grade 2 papillary carcinoma, difficult to differentiate between in situ and invasive carcinoma on biopsy specimen. ER positive (>95%) WI positive (>95%) HER2 negative. Review of Systems   Constitutional: Negative. HENT: Negative. Eyes: Negative. Respiratory: Negative. Cardiovascular: Negative. Gastrointestinal: Negative. Genitourinary: Negative. Musculoskeletal: Negative. Skin: Negative. Neurological: Negative. Psychiatric/Behavioral: Negative.      All other systems reviewed and are negative.:    There is no new onset of persistent dry cough, abdominal pains, new onset of headache, change in bowel function, or bony tenderness to suggest metastatic disease at this time. Pathology:  Department of Pathology   ADDENDUM SURGICAL PATHOLOGY REPORT   Patient Name:  Troy Farnco                Accession No:  [id number]    Age Sex:   1950    74 Y / F      Location:      [facility]   Account No:    [de-identified]                 Collected:     2023   Med Rec No:    [id number]                Received:      2023   Attend Phys:   Nelda Bragg MD         Completed:     2023   Perform Phys:  Nelda Bragg MD           ADDENDUM:   Ancillary studies   ER status (by immunohistochemistry): Positive (>95% of cells positive   with strong average intensity of expression)   MN status (by immunohistochemistry): Positive (>95% of cells positive   with strong average intensity of expression)   HER-2 status (by immunohistochemistry): Negative (score 0). Duration of fixation: Greater than 6 hours and less than 72 hours   Performed by immunohistochemistry with manual quantitation. ER clone is   [place] SP1. MN clone is [place]ney clone 1E2. Test is performed on formalin   fixed paraffin embedded tissue using [place] iVIEW DAB detection kit   (Biotin streptavidin). Positive and negative control tissue shows   appropriate staining. Her2/kat scoring follows the ASCO/CAP guidelines: 0, 1+, 2+, 3+. Her2/kat   \"positive\" (3+) requires circrumferential membrane staining that is   complete, intense and in >10% of tumor cells. Equivocal (2+) cases are   defined as weak to moderate complete membrane staining in >10% of cells. Negative cases (0 or 1+, respectively) display no staining or incomplete   membrane staining that is faint/barely perceptible and in >10% of tumor   cells.    (Navarro Diaz et al, Human Epidermal Growth Factor Receptor 2   Testing in Breast Cancer, Arch Pathol Lab Med, Vol 142, November, 2018). Select cases, including all 2+/equivocal for Her2/kat on routine IHC   staining, will be sent for Her2/kat analysis by FISH. The stain was performed on [employer], and performed with   the [place] ultraView Universal DAB detection kit. These assays have not   been validated on decalcified tissue. Results should be interpreted with   caution given the likelihood of false negativity on decalcified   specimens. Analyte specific reagent (ASR) disclaimer: The use of one or   more reagents in the above tests is regulated as an analyte specific   reagent. These tests were developed and their performance characteristics   determined by the [facility]. They have   not been cleared by the [employer] and Drug Administration. The FDA has   determined that such clearance or approval is not necessary. Kecia Nieves M.D.   (Electronic Signature)   02/02/2023        FINAL DIAGNOSIS:        A. Right breast, 11 o' clock, biopsy:        - Breast tissue with stromal fibrosis and papillary apocrine          metaplasia        - No evidence of atypia or malignancy        B. Right breast, 10 o' clock, biopsy:        - Papillary carcinoma-see comment      COMMENT:   Note: Specimen B contains a proliferation of monotonous cells with focal   papillary architecture. Immunohistochemical staining reveals the   proliferation is positive for ER (strong diffuse) and synaptophysin   (focal) while being negative for CD56 and chromogranin. Calponin and p63   highlight reduced, but focally intact, myoepithelial cells. The differential diagnosis includes both in situ and invasive neoplasms,   including papillary DCIS, encapsulated papillary carcinoma, and solid   papillary carcinoma. Final classification is best performed following   more thorough sampling of an excisional specimen.      Location: Right breast, 10:00   Invasive carcinoma: see note above   Number of cores involved/total cores: 4/5 cores involved by invasive   carcinoma   Longest length on slide: 11 mm   Histologic type: see note above   Modified Damon-Schwartz ([place]vis[place]) Grade: grade 2 (intermediate)   Lymphatic/Vascular invasion: not identified   Ductal carcinoma in-situ: see note above     Ancillary studies   ER status (by immunohistochemistry): Positive (>95% of cells positive   with strong average intensity of expression)    Note: HER2 and AK are pending and will be reported in an addendum. Duration of fixation: Greater than 6 hours and less than 72 hours   Performed by immunohistochemistry with manual quantitation. ER clone is   [place] SP1. AK clone is [place] clone 1E2. Test is performed on formalin   fixed paraffin embedded tissue using [place] iVIEW DAB detection kit   (Biotin streptavidin). Positive and negative control tissue shows   appropriate staining. Her2/kat scoring follows the ASCO/CAP guidelines: 0, 1+, 2+, 3+. Her2/kat   \"positive\" (3+) requires circrumferential membrane staining that is   complete, intense and in >10% of tumor cells. Equivocal (2+) cases are   defined as weak to moderate complete membrane staining in >10% of cells. Negative cases (0 or 1+, respectively) display no staining or incomplete   membrane staining that is faint/barely perceptible and in >10% of tumor   cells. (Randal West et al, Human Epidermal Growth Factor Receptor 2   Testing in Breast Cancer, Arch Pathol Lab Med, Vol 142, November, 2018). Select cases, including all 2+/equivocal for Her2/kat on routine IHC   staining, will be sent for Her2/kat analysis by FISH. The stain was [facility], and performed with   the [place] ultraView Universal DAB detection kit. These assays have not   been validated on decalcified tissue. Results should be interpreted with   caution given the likelihood of false negativity on decalcified   specimens.  Analyte specific reagent (ASR) disclaimer: The use of one or   more reagents in the above tests is regulated as an analyte specific   reagent. These tests were developed and their performance characteristics   determined by the [facility]. They have   not been cleared by the [employer] and Drug Administration. The FDA has   determined that such clearance or approval is not necessary. LAUREANO/LAUREANO       Past Medical History:   Diagnosis Date    Asthma     Kidney stones    :        Past Surgical History:   Procedure Laterality Date    ELBOW FRACTURE SURGERY Right     WITH LEFT ANKLE FRACTURE SURGERY     FEMUR SURGERY Right     FRACTURE    FRACTURE SURGERY      RIGHT ELBOW AND LEFT ANKLE    HARDWARE REMOVAL      SCREW REMOVAL FROM FEMUR    IR URETERAL CATHETER OR STENT PLACEMENT  3/17/2021    IR URETERAL CATHETER OR STENT PLACEMENT 3/17/2021 [facility] SPECIAL PROCEDURES    JOINT REPLACEMENT Left     KIDNEY REMOVAL Right 3/17/2021    RIGHT PERCUTANEOUS NEPHROLITHOTOMY, CYSTOSCOPY performed by Myron Ferrera DO at [address] Right 2023    US BREAST BIOPSY NEEDLE ADDITIONAL RIGHT 2023 [facility] ULTRASOUND    US BREAST BIOPSY W LOC DEVICE 1ST LESION RIGHT Right 2023    US BREAST BIOPSY W LOC DEVICE 1ST LESION RIGHT 2023 [facility] ULTRASOUND   :        Allergies as of 2023    (No Known Allergies)   :        Social History     Tobacco Use    Smoking status: Never    Smokeless tobacco: Never   Vaping Use    Vaping Use: Never used   Substance Use Topics    Alcohol use: Not Currently    Drug use: Never   :      Family History: Mother: Breast cancer, diagnosed age 35s  Paternal grandmother: Brain cancer, diagnosed age 76  No additional family history of breast or ovarian cancer  Please see intake form for additional family details. Hormonal History:   a.0  m.0  Menarche at age 13. First live birth at age 20. did breastfeed.   Postmenopausal at age 45-48  Hysterectomy: no hysterectomy  No estrogen supplementation  OCP taken in the past but not currently    Medications:  Medication documentation has been reviewed in the electronic medical record and patient office intake form. Exam:  There were no vitals taken for this visit. Constitutional: She appears well-nourished. No apparent distress. Breast: The patient was examined in the upright and supine position. She has a \"DDD\" cup breast. Breasts are symmetrically ptotic. Right: There is ecchymosis related to her recent biopsy. There were no new masses or changes in breast contour. There were no skin changes of the breast or nipple areolar complex. There was no nipple inversion or discharge. Left: There were no new masses or changes in breast contour. There were no skin changes of the breast or nipple areolar complex. There was no nipple inversion or discharge. There is no axillary lymphadenopathy palpated bilaterally. Head: Normocephalic and atraumatic. Eyes: EOM are normal. Pupils are equal, round, and reactive to light. Neck: Neck supple. No tracheal deviation present. Cardiovascular: regular rate. Extremities appear well perfused. Pulmonary: respirations are non-labored and without audible distress  Lymphatics: no palpable axillary or cervical lymphadenopathy. Skin: No rash noted. No erythema. Neurologic: alert and oriented. Assessment/Plan:  [id number]:  IA right breast cancer  (In situ versus invasive carcinoma based on final pathology)  ER + PA+ HER2 negative. I have reviewed the results of her most recent breast imaging and pathology with her and her son. The surgical rational and technical details of undergoing breast conservation therapy versus a mastectomy were reviewed.  She understood that patients who undergo breast conservation therapy, systemic therapy, and radiotherapy have comparable local recurrences and overall survival to those patients undergoing a mastectomy. She understands that patients may experience an asymmetric change in breast contour with breast conservation that can be exacerbated with radiation therapy. We discussed the technique of localization. I explained that prior to surgery she will be taken to radiology where they will target the lesion near which the clip was placed post biopsy. This is performed using either mammographic or ultrasound guidance. We discussed the aspects of breast conservation including the need for negative margins. We discussed the risk of up to a 15-20% chance of having a positive margin and that this would in fact lead to additional surgery. We discussed marking the surgical cavity for potential future radiation. We also discussed the option of a mastectomy. We discussed that although we remove the breast in this procedure, there is still a risk of recurrent disease and reviewed expectations on residual breast tissue. We discussed margins. We reviewed possible hospitalization and the need for surgical drains. We discussed additional risk and benefits of surgery which include but are not limited to anesthesia related risks, bleeding, range of motion difficulty, chronic pain and or numbness, infection (<1%), wound complications and unappealing cosmetics. We discussed the risk of contralateral breast cancer. We also discussed the possibility of future recurrences. We reviewed expectations for recovery. We discussed utility of lymph node evaluation both in situ and invasive carcinoma. I reviewed a sentinel lymph node biopsy in great detail. I also reviewed considerations for omission of sentinel lymph node biopsy. I described the procedure of both an injection of a radioisotope as well as blue dye with migration to the axilla. I discussed the side effects including discoloration of the urine, stool, and breast as well as the possibility that the axilla would not map.  We reviewed the concept of false negative as relates to mapping the axilla. We reviewed the 3-5% risk of lymphedema. We discussed additional risks such as permanent arm numbness, the potential for nerve injury, and the possibility of a false negative finding. I discussed the fact that if we identified positive nodes, she may require a completion lymph node dissection. I reviewed the details of a level I and II ALND procedure as well as its risk of lymphedema as it compares to SLNB. Systemic therapy including chemotherapy and endocrine therapy were reviewed. I have recommended she undergo a consultation with medical oncology to discuss these treatments. We discussed that radiation therapy is traditionally given to patients undergoing breast conservation therapy to reduce the risk of local recurrence. I have recommended she undergo a postoperative consultation with radiation oncology to discuss this treatment. Fertility does not apply. The role of genetic consultation was discussed. She declines at this time. Reconstructive options in patients undergoing breast conservation versus a mastectomy were reviewed. Anticipated clinic follow up and survivorship were discussed. Self breast evaluation was reviewed. We reviewed prehab/rehab opportunities. At this time we will plan for a right partial mastectomy with pending decision making on mary evaluation. We discussed options to include omitting them at this time given the possibility of in situ disease. In the setting of invasive disease there are considerations to omit as well. We also discussed performing mary evaluation in the likelihood invasive disease is identified to offer further diagnostic information. All of the patient's questions were answered at this time however, she was encouraged to call the office with any further inquiries.  Approximately 80 minutes of time were spent in preparation, direct patient contact, record review, imaging interpretation, care coordination, documentation and activities otherwise related to this encounter.

## 2023-02-09 ENCOUNTER — INITIAL CONSULT (OUTPATIENT)
Dept: SURGERY | Age: 73
End: 2023-02-09
Payer: MEDICARE

## 2023-02-09 VITALS
RESPIRATION RATE: 19 BRPM | HEIGHT: 63 IN | DIASTOLIC BLOOD PRESSURE: 91 MMHG | WEIGHT: 163 LBS | OXYGEN SATURATION: 96 % | BODY MASS INDEX: 28.88 KG/M2 | SYSTOLIC BLOOD PRESSURE: 160 MMHG

## 2023-02-09 DIAGNOSIS — C50.911 PRIMARY BREAST MALIGNANCY, RIGHT (HCC): Primary | ICD-10-CM

## 2023-02-09 PROCEDURE — 99205 OFFICE O/P NEW HI 60 MIN: CPT | Performed by: SURGERY

## 2023-02-09 RX ORDER — SODIUM CHLORIDE 9 MG/ML
INJECTION, SOLUTION INTRAVENOUS PRN
OUTPATIENT
Start: 2023-02-09

## 2023-02-09 RX ORDER — SODIUM CHLORIDE 0.9 % (FLUSH) 0.9 %
5-40 SYRINGE (ML) INJECTION EVERY 12 HOURS SCHEDULED
OUTPATIENT
Start: 2023-02-09

## 2023-02-09 RX ORDER — SODIUM CHLORIDE 0.9 % (FLUSH) 0.9 %
5-40 SYRINGE (ML) INJECTION PRN
OUTPATIENT
Start: 2023-02-09

## 2023-02-09 RX ORDER — SODIUM CHLORIDE, SODIUM LACTATE, POTASSIUM CHLORIDE, CALCIUM CHLORIDE 600; 310; 30; 20 MG/100ML; MG/100ML; MG/100ML; MG/100ML
INJECTION, SOLUTION INTRAVENOUS CONTINUOUS
OUTPATIENT
Start: 2023-02-09

## 2023-02-10 DIAGNOSIS — C50.911 PRIMARY BREAST MALIGNANCY, RIGHT (HCC): Primary | ICD-10-CM

## 2023-02-14 ENCOUNTER — TELEPHONE (OUTPATIENT)
Dept: SURGERY | Age: 73
End: 2023-02-14

## 2023-02-14 NOTE — TELEPHONE ENCOUNTER
Morris Beltran called today. Will move forward with Right [place] Lymph Node Biopsy. Surgery letter updated.        Thanks, Allison Nava

## 2023-03-06 ENCOUNTER — TELEPHONE (OUTPATIENT)
Dept: SURGERY | Age: 73
End: 2023-03-06

## 2023-03-06 NOTE — TELEPHONE ENCOUNTER
Zeinab from [address] called in regards to needing and order faxed over for [address]. Patient has appointment on 03/08/23. Asked Jane De Souza if the order was in Epic and she said yes but that they needed it faxed over. Fax number is [phone].

## 2023-03-07 DIAGNOSIS — C50.911 PRIMARY BREAST MALIGNANCY, RIGHT (HCC): Primary | ICD-10-CM

## 2023-03-07 NOTE — PROGRESS NOTES
Patient stated that she did  not get a call from the nurse navigator about being tagged pre-op. . Breast center notified. Dr. Noelle Romberg to be consulted on what needs to be done.

## 2023-03-07 NOTE — PROGRESS NOTES
Name_______________________________________Printed:____________________  Date and time of surgery__3/8/2023______0815________________Arrival Time:_____0645___________   1. The instructions given regarding when and if a patient needs to stop oral intake prior to surgery varies. Follow the specific instructions you were given                  _x__Nothing to eat or to drink after Midnight the night before.                   ____Carbo loading or instructions will be given to select patients-if you have been given those instructions -please do the following                           The evening before your surgery after dinner before midnight drink 40 ounces of gatorade. If you are diabetic use sugar free. The morning of surgery drink 40 ounces of water. This needs to be finished 3 hours prior to your surgery start time. 2. Take the following pills with a small sip of water on the morning of surgery___advair________________________________________________                  Do not take blood pressure medications ending in pril or sartan the don prior to surgery or the morning of surgery. Dr Chi Billy patient are not to take any medications the AM of surgery. 3. Aspirin, Ibuprofen, Advil, Naproxen, Vitamin E and other Anti-inflammatory products and supplements should be stopped for 5 -7days before surgery or as directed by your physician. 4. Check with your Doctor regarding stopping Plavix, Coumadin,Eliquis, Lovenox,Effient,Pradaxa,Xarelto, Fragmin or other blood thinners and follow their instructions. 5. Do not smoke, and do not drink any alcoholic beverages 24 hours prior to surgery. This includes NA Beer. Refrain from the usage of any recreational drugs. 6. You may brush your teeth and gargle the morning of surgery. DO NOT SWALLOW WATER   7. You MUST make arrangements for a responsible adult to stay on site while you are here and take you home after your surgery.  You will not be allowed to leave alone or drive yourself home. It is strongly suggested someone stay with you the first 24 hrs. Your surgery will be cancelled if you do not have a ride home. 8. A parent/legal guardian must accompany a child scheduled for surgery and plan to stay at the hospital until the child is discharged. Please do not bring other children with you. 9. Please wear simple, loose fitting clothing to the hospital.  Charlene Cedeno not bring valuables (money, credit cards, checkbooks, etc.) Do not wear any makeup (including no eye makeup) or nail polish on your fingers or toes. 10. DO NOT wear any jewelry or piercings on day of surgery. All body piercing jewelry must be removed. 11. If you have ___dentures, they will be removed before going to the OR; we will provide you a container. If you wear ___contact lenses or ___glasses, they will be removed; please bring a case for them. 12. Please see your family doctor/pediatrician for a history & physical and/or concerning medications. Bring any test results/reports from your physician's office. PCP__________________Phone___________H&P Appt. Date________             13 If you  have a Living Will and Durable Power of  for Healthcare, please bring in a copy. 15. Notify your Surgeon if you develop any illness between now and surgery  time, cough, cold, fever, sore throat, nausea, vomiting, etc.  Please notify your surgeon if you experience dizziness, shortness of breath or blurred vision between now & the time of your surgery             15. DO NOT shave your operative site 96 hours prior to surgery. For face & neck surgery, men may use an electric razor 48 hours prior to surgery. 16. Shower the night before or morning of surgery using an antibacterial soap or as you have been instructed. 17. To provide excellent care visitors will be limited to one in the room at any given time.              18.  Please bring picture ID and insurance card. 19.  Visit our web site for additional information:  [email or web address]              20.During flu season no children under the age of 15 are permitted in the hospital for the safety of all patients. 21. If you take a long acting insulin in the evening only  take half of your usual  dose the night  before your procedure              22. If you use a c-pap please bring DOS if staying overnight,             23.For your convenience [facility] has a pharmacy on site to fill your prescriptions. 24. If you use oxygen and have a portable tank please bring it  with you the DOS             25. Bring a complete list of all your medications with name and dose include any supplements. 26. Other__________________________________________   *Please call pre admission testing if you any further questions   James Valderrama   [address] [place] [address]. [facility]  [phone]   [address] [place] [address]       VISITOR POLICY(subject to change)    Current policy is 2 visitors per patient. No children. Mask is  at the discretion of the facility. Visiting hours are 8a-8p. Overnight visitors will be at the discretion of the nurse. All policies subject to change. All above information reviewed with patient in person or by phone. Patient verbalizes understanding. All questions and concerns addressed.                                                                                                  Patient/Rep______patient______________                                                                                                                                    PRE OP INSTRUCTIONS

## 2023-03-08 ENCOUNTER — HOSPITAL ENCOUNTER (OUTPATIENT)
Dept: ULTRASOUND IMAGING | Age: 73
Setting detail: OUTPATIENT SURGERY
Discharge: HOME | End: 2023-03-08
Attending: SURGERY
Payer: MEDICARE

## 2023-03-08 ENCOUNTER — ANESTHESIA (OUTPATIENT)
Dept: OPERATING ROOM | Age: 73
End: 2023-03-08
Payer: MEDICARE

## 2023-03-08 ENCOUNTER — APPOINTMENT (OUTPATIENT)
Dept: WOMENS IMAGING | Age: 73
End: 2023-03-08
Attending: SURGERY
Payer: MEDICARE

## 2023-03-08 ENCOUNTER — ANESTHESIA EVENT (OUTPATIENT)
Dept: OPERATING ROOM | Age: 73
End: 2023-03-08
Payer: MEDICARE

## 2023-03-08 ENCOUNTER — HOSPITAL ENCOUNTER (OUTPATIENT)
Dept: NUCLEAR MEDICINE | Age: 73
Discharge: HOME | End: 2023-03-08
Attending: SURGERY
Payer: MEDICARE

## 2023-03-08 ENCOUNTER — HOSPITAL ENCOUNTER (OUTPATIENT)
Age: 73
Setting detail: OUTPATIENT SURGERY
Discharge: HOME | End: 2023-03-08
Attending: SURGERY | Admitting: SURGERY
Payer: MEDICARE

## 2023-03-08 ENCOUNTER — HOSPITAL ENCOUNTER (OUTPATIENT)
Dept: WOMENS IMAGING | Age: 73
Setting detail: OUTPATIENT SURGERY
Discharge: HOME | End: 2023-03-08
Attending: SURGERY
Payer: MEDICARE

## 2023-03-08 VITALS
WEIGHT: 163.2 LBS | OXYGEN SATURATION: 98 % | BODY MASS INDEX: 28.92 KG/M2 | TEMPERATURE: 97.7 F | DIASTOLIC BLOOD PRESSURE: 92 MMHG | HEART RATE: 74 BPM | HEIGHT: 63 IN | RESPIRATION RATE: 13 BRPM | SYSTOLIC BLOOD PRESSURE: 173 MMHG

## 2023-03-08 DIAGNOSIS — Z98.890 STATUS POST RIGHT BREAST BIOPSY: ICD-10-CM

## 2023-03-08 DIAGNOSIS — C50.911 PRIMARY BREAST MALIGNANCY, RIGHT (HCC): ICD-10-CM

## 2023-03-08 DIAGNOSIS — G89.18 POST-OP PAIN: Primary | ICD-10-CM

## 2023-03-08 LAB
ABO/RH: NORMAL
ANTIBODY SCREEN: NORMAL

## 2023-03-08 PROCEDURE — 6360000002 HC RX W HCPCS: Performed by: NURSE ANESTHETIST, CERTIFIED REGISTERED

## 2023-03-08 PROCEDURE — 7100000011 HC PHASE II RECOVERY - ADDTL 15 MIN: Performed by: SURGERY

## 2023-03-08 PROCEDURE — 3700000000 HC ANESTHESIA ATTENDED CARE: Performed by: SURGERY

## 2023-03-08 PROCEDURE — 86850 RBC ANTIBODY SCREEN: CPT

## 2023-03-08 PROCEDURE — 88341 IMHCHEM/IMCYTCHM EA ADD ANTB: CPT

## 2023-03-08 PROCEDURE — 7100000000 HC PACU RECOVERY - FIRST 15 MIN: Performed by: SURGERY

## 2023-03-08 PROCEDURE — 2709999900 HC NON-CHARGEABLE SUPPLY: Performed by: SURGERY

## 2023-03-08 PROCEDURE — 76098 X-RAY EXAM SURGICAL SPECIMEN: CPT

## 2023-03-08 PROCEDURE — 2580000003 HC RX 258: Performed by: SURGERY

## 2023-03-08 PROCEDURE — 3430000000 HC RX DIAGNOSTIC RADIOPHARMACEUTICAL: Performed by: SURGERY

## 2023-03-08 PROCEDURE — 3600000004 HC SURGERY LEVEL 4 BASE: Performed by: SURGERY

## 2023-03-08 PROCEDURE — 86900 BLOOD TYPING SEROLOGIC ABO: CPT

## 2023-03-08 PROCEDURE — 38525 BIOPSY/REMOVAL LYMPH NODES: CPT | Performed by: SURGERY

## 2023-03-08 PROCEDURE — 3700000001 HC ADD 15 MINUTES (ANESTHESIA): Performed by: SURGERY

## 2023-03-08 PROCEDURE — 88305 TISSUE EXAM BY PATHOLOGIST: CPT

## 2023-03-08 PROCEDURE — 86901 BLOOD TYPING SEROLOGIC RH(D): CPT

## 2023-03-08 PROCEDURE — 3600000014 HC SURGERY LEVEL 4 ADDTL 15MIN: Performed by: SURGERY

## 2023-03-08 PROCEDURE — 2720000010 HC SURG SUPPLY STERILE: Performed by: SURGERY

## 2023-03-08 PROCEDURE — 88307 TISSUE EXAM BY PATHOLOGIST: CPT

## 2023-03-08 PROCEDURE — 77065 DX MAMMO INCL CAD UNI: CPT

## 2023-03-08 PROCEDURE — 38900 IO MAP OF SENT LYMPH NODE: CPT | Performed by: SURGERY

## 2023-03-08 PROCEDURE — A4648 IMPLANTABLE TISSUE MARKER: HCPCS

## 2023-03-08 PROCEDURE — 2500000003 HC RX 250 WO HCPCS: Performed by: SURGERY

## 2023-03-08 PROCEDURE — 2709999900 US PLACE BREAST LOC DEVICE 1ST LESION RIGHT

## 2023-03-08 PROCEDURE — 6360000002 HC RX W HCPCS: Performed by: SURGERY

## 2023-03-08 PROCEDURE — 38792 RA TRACER ID OF SENTINL NODE: CPT | Performed by: SURGERY

## 2023-03-08 PROCEDURE — A4648 IMPLANTABLE TISSUE MARKER: HCPCS | Performed by: SURGERY

## 2023-03-08 PROCEDURE — 38792 RA TRACER ID OF SENTINL NODE: CPT

## 2023-03-08 PROCEDURE — 14001 TIS TRNFR TRUNK 10.1-30SQCM: CPT | Performed by: SURGERY

## 2023-03-08 PROCEDURE — 7100000001 HC PACU RECOVERY - ADDTL 15 MIN: Performed by: SURGERY

## 2023-03-08 PROCEDURE — 19301 PARTIAL MASTECTOMY: CPT | Performed by: SURGERY

## 2023-03-08 PROCEDURE — 88342 IMHCHEM/IMCYTCHM 1ST ANTB: CPT

## 2023-03-08 PROCEDURE — A9520 TC99 TILMANOCEPT DIAG 0.5MCI: HCPCS | Performed by: SURGERY

## 2023-03-08 PROCEDURE — 7100000010 HC PHASE II RECOVERY - FIRST 15 MIN: Performed by: SURGERY

## 2023-03-08 PROCEDURE — 2500000003 HC RX 250 WO HCPCS: Performed by: NURSE ANESTHETIST, CERTIFIED REGISTERED

## 2023-03-08 DEVICE — MARKER SURG TISS W2XH1XL2CM BIOZORB LO PROF: Type: IMPLANTABLE DEVICE | Site: BREAST | Status: FUNCTIONAL

## 2023-03-08 RX ORDER — ONDANSETRON 2 MG/ML
4 INJECTION INTRAMUSCULAR; INTRAVENOUS
Status: DISCONTINUED | OUTPATIENT
Start: 2023-03-08 | End: 2023-03-08 | Stop reason: HOSPADM

## 2023-03-08 RX ORDER — LIDOCAINE HYDROCHLORIDE 20 MG/ML
INJECTION, SOLUTION INFILTRATION; PERINEURAL PRN
Status: DISCONTINUED | OUTPATIENT
Start: 2023-03-08 | End: 2023-03-08 | Stop reason: SDUPTHER

## 2023-03-08 RX ORDER — SODIUM CHLORIDE, SODIUM LACTATE, POTASSIUM CHLORIDE, CALCIUM CHLORIDE 600; 310; 30; 20 MG/100ML; MG/100ML; MG/100ML; MG/100ML
INJECTION, SOLUTION INTRAVENOUS CONTINUOUS
Status: DISCONTINUED | OUTPATIENT
Start: 2023-03-08 | End: 2023-03-08 | Stop reason: HOSPADM

## 2023-03-08 RX ORDER — LIDOCAINE HYDROCHLORIDE 10 MG/ML
5 INJECTION, SOLUTION EPIDURAL; INFILTRATION; INTRACAUDAL; PERINEURAL ONCE
Status: COMPLETED | OUTPATIENT
Start: 2023-03-08 | End: 2023-03-08

## 2023-03-08 RX ORDER — LABETALOL HYDROCHLORIDE 5 MG/ML
10 INJECTION, SOLUTION INTRAVENOUS
Status: DISCONTINUED | OUTPATIENT
Start: 2023-03-08 | End: 2023-03-08 | Stop reason: HOSPADM

## 2023-03-08 RX ORDER — SUCCINYLCHOLINE/SOD CL,ISO/PF 200MG/10ML
SYRINGE (ML) INTRAVENOUS PRN
Status: DISCONTINUED | OUTPATIENT
Start: 2023-03-08 | End: 2023-03-08 | Stop reason: SDUPTHER

## 2023-03-08 RX ORDER — SODIUM CHLORIDE 9 MG/ML
INJECTION, SOLUTION INTRAVENOUS PRN
Status: DISCONTINUED | OUTPATIENT
Start: 2023-03-08 | End: 2023-03-08 | Stop reason: HOSPADM

## 2023-03-08 RX ORDER — EPHEDRINE SULFATE/0.9% NACL/PF 50 MG/5 ML
SYRINGE (ML) INTRAVENOUS PRN
Status: DISCONTINUED | OUTPATIENT
Start: 2023-03-08 | End: 2023-03-08 | Stop reason: SDUPTHER

## 2023-03-08 RX ORDER — PROPOFOL 10 MG/ML
INJECTION, EMULSION INTRAVENOUS PRN
Status: DISCONTINUED | OUTPATIENT
Start: 2023-03-08 | End: 2023-03-08 | Stop reason: SDUPTHER

## 2023-03-08 RX ORDER — SODIUM CHLORIDE 0.9 % (FLUSH) 0.9 %
5-40 SYRINGE (ML) INJECTION EVERY 12 HOURS SCHEDULED
Status: DISCONTINUED | OUTPATIENT
Start: 2023-03-08 | End: 2023-03-08 | Stop reason: HOSPADM

## 2023-03-08 RX ORDER — HYDROMORPHONE HCL 110MG/55ML
0.5 PATIENT CONTROLLED ANALGESIA SYRINGE INTRAVENOUS EVERY 5 MIN PRN
Status: DISCONTINUED | OUTPATIENT
Start: 2023-03-08 | End: 2023-03-08 | Stop reason: HOSPADM

## 2023-03-08 RX ORDER — LIDOCAINE HYDROCHLORIDE 10 MG/ML
1 INJECTION, SOLUTION EPIDURAL; INFILTRATION; INTRACAUDAL; PERINEURAL
Status: DISCONTINUED | OUTPATIENT
Start: 2023-03-08 | End: 2023-03-08 | Stop reason: HOSPADM

## 2023-03-08 RX ORDER — OXYCODONE HYDROCHLORIDE 5 MG/1
5 TABLET ORAL
Status: DISCONTINUED | OUTPATIENT
Start: 2023-03-08 | End: 2023-03-08 | Stop reason: HOSPADM

## 2023-03-08 RX ORDER — MEPERIDINE HYDROCHLORIDE 25 MG/ML
12.5 INJECTION INTRAMUSCULAR; INTRAVENOUS; SUBCUTANEOUS EVERY 5 MIN PRN
Status: DISCONTINUED | OUTPATIENT
Start: 2023-03-08 | End: 2023-03-08 | Stop reason: HOSPADM

## 2023-03-08 RX ORDER — KETOROLAC TROMETHAMINE 30 MG/ML
INJECTION, SOLUTION INTRAMUSCULAR; INTRAVENOUS PRN
Status: DISCONTINUED | OUTPATIENT
Start: 2023-03-08 | End: 2023-03-08 | Stop reason: SDUPTHER

## 2023-03-08 RX ORDER — DEXAMETHASONE SODIUM PHOSPHATE 4 MG/ML
INJECTION, SOLUTION INTRA-ARTICULAR; INTRALESIONAL; INTRAMUSCULAR; INTRAVENOUS; SOFT TISSUE PRN
Status: DISCONTINUED | OUTPATIENT
Start: 2023-03-08 | End: 2023-03-08 | Stop reason: SDUPTHER

## 2023-03-08 RX ORDER — FENTANYL CITRATE 50 UG/ML
INJECTION, SOLUTION INTRAMUSCULAR; INTRAVENOUS PRN
Status: DISCONTINUED | OUTPATIENT
Start: 2023-03-08 | End: 2023-03-08 | Stop reason: SDUPTHER

## 2023-03-08 RX ORDER — LIDOCAINE HYDROCHLORIDE 10 MG/ML
5 INJECTION, SOLUTION EPIDURAL; INFILTRATION; INTRACAUDAL; PERINEURAL ONCE
Status: CANCELLED | OUTPATIENT
Start: 2023-03-08 | End: 2023-03-08

## 2023-03-08 RX ORDER — BUPIVACAINE HYDROCHLORIDE AND EPINEPHRINE 5; 5 MG/ML; UG/ML
INJECTION, SOLUTION PERINEURAL
Status: COMPLETED | OUTPATIENT
Start: 2023-03-08 | End: 2023-03-08

## 2023-03-08 RX ORDER — HYDRALAZINE HYDROCHLORIDE 20 MG/ML
10 INJECTION INTRAMUSCULAR; INTRAVENOUS
Status: DISCONTINUED | OUTPATIENT
Start: 2023-03-08 | End: 2023-03-08 | Stop reason: HOSPADM

## 2023-03-08 RX ORDER — SODIUM CHLORIDE 0.9 % (FLUSH) 0.9 %
5-40 SYRINGE (ML) INJECTION PRN
Status: DISCONTINUED | OUTPATIENT
Start: 2023-03-08 | End: 2023-03-08 | Stop reason: HOSPADM

## 2023-03-08 RX ORDER — MIDAZOLAM HYDROCHLORIDE 1 MG/ML
INJECTION INTRAMUSCULAR; INTRAVENOUS PRN
Status: DISCONTINUED | OUTPATIENT
Start: 2023-03-08 | End: 2023-03-08 | Stop reason: SDUPTHER

## 2023-03-08 RX ORDER — HYDROCODONE BITARTRATE AND ACETAMINOPHEN 5; 325 MG/1; MG/1
1 TABLET ORAL EVERY 4 HOURS PRN
Qty: 42 TABLET | Refills: 0 | Status: SHIPPED | OUTPATIENT
Start: 2023-03-08 | End: 2023-03-15

## 2023-03-08 RX ORDER — ONDANSETRON 2 MG/ML
INJECTION INTRAMUSCULAR; INTRAVENOUS PRN
Status: DISCONTINUED | OUTPATIENT
Start: 2023-03-08 | End: 2023-03-08 | Stop reason: SDUPTHER

## 2023-03-08 RX ORDER — ISOSULFAN BLUE 50 MG/5ML
INJECTION, SOLUTION SUBCUTANEOUS
Status: COMPLETED | OUTPATIENT
Start: 2023-03-08 | End: 2023-03-08

## 2023-03-08 RX ADMIN — TILMANOCEPT 0.44 MILLICURIE: KIT at 10:23

## 2023-03-08 RX ADMIN — MIDAZOLAM 1 MG: 1 INJECTION INTRAMUSCULAR; INTRAVENOUS at 09:49

## 2023-03-08 RX ADMIN — Medication 140 MG: at 09:56

## 2023-03-08 RX ADMIN — MIDAZOLAM 1 MG: 1 INJECTION INTRAMUSCULAR; INTRAVENOUS at 09:53

## 2023-03-08 RX ADMIN — Medication 10 MG: at 10:15

## 2023-03-08 RX ADMIN — CEFAZOLIN 2000 MG: 2 INJECTION, POWDER, FOR SOLUTION INTRAMUSCULAR; INTRAVENOUS at 09:47

## 2023-03-08 RX ADMIN — LIDOCAINE HYDROCHLORIDE 5 ML: 10 INJECTION, SOLUTION EPIDURAL; INFILTRATION; INTRACAUDAL; PERINEURAL at 08:44

## 2023-03-08 RX ADMIN — ONDANSETRON 4 MG: 2 INJECTION INTRAMUSCULAR; INTRAVENOUS at 11:21

## 2023-03-08 RX ADMIN — DEXAMETHASONE SODIUM PHOSPHATE 10 MG: 4 INJECTION, SOLUTION INTRAMUSCULAR; INTRAVENOUS at 09:56

## 2023-03-08 RX ADMIN — KETOROLAC TROMETHAMINE 30 MG: 30 INJECTION, SOLUTION INTRAMUSCULAR; INTRAVENOUS at 11:21

## 2023-03-08 RX ADMIN — Medication 20 MG: at 10:01

## 2023-03-08 RX ADMIN — LIDOCAINE HYDROCHLORIDE 5 ML: 10 INJECTION, SOLUTION EPIDURAL; INFILTRATION; INTRACAUDAL; PERINEURAL at 08:43

## 2023-03-08 RX ADMIN — FENTANYL CITRATE 50 MCG: 50 INJECTION, SOLUTION INTRAMUSCULAR; INTRAVENOUS at 09:54

## 2023-03-08 RX ADMIN — LIDOCAINE HYDROCHLORIDE 100 MG: 20 INJECTION, SOLUTION INFILTRATION; PERINEURAL at 09:55

## 2023-03-08 RX ADMIN — SODIUM CHLORIDE, POTASSIUM CHLORIDE, SODIUM LACTATE AND CALCIUM CHLORIDE: 600; 310; 30; 20 INJECTION, SOLUTION INTRAVENOUS at 09:09

## 2023-03-08 RX ADMIN — FENTANYL CITRATE 50 MCG: 50 INJECTION, SOLUTION INTRAMUSCULAR; INTRAVENOUS at 10:44

## 2023-03-08 RX ADMIN — PROPOFOL 150 MG: 10 INJECTION, EMULSION INTRAVENOUS at 09:56

## 2023-03-08 ASSESSMENT — LIFESTYLE VARIABLES: SMOKING_STATUS: 0

## 2023-03-08 ASSESSMENT — COPD QUESTIONNAIRES: CAT_SEVERITY: MODERATE

## 2023-03-08 ASSESSMENT — PAIN - FUNCTIONAL ASSESSMENT: PAIN_FUNCTIONAL_ASSESSMENT: 0-10

## 2023-03-08 NOTE — OP NOTE
Operative Note    Postoperative Note    Archie Goodpasture  YOB: 1950  [id number]    Pre-operative Diagnosis: T is vs 1 Nx right breast cancer with papillary lesion  Primary breast malignancy, right (HCC) [C50.911]    Post-operative Diagnosis: Same    Procedure: Injection of isosulfuran blue dye for lymphatic mapping, injection of radiopharmaceutical for lymphatic mapping, lymphatic mapping with neoprobe unit, right deep axillary selective lymph node dissection, right localizer directed partial mastectomy, left tissue rearrangement procedure for area of 12 sq. Cm. Immediate insertion of a breast prosthesis: BioZorb, excision of additional right breast lesion    Anesthesia: General    Surgeons/Assistants: Keli Wilson  Assistant: Cooper Gutierrez    Estimated Blood Loss: less than 50     Drains: none    Complications: None apparent at conclusion of procedure    Specimens: right breast tissue, sentinel nodes (see below for details)    Findings: sentinel nodes, specimen radiograph shows capture of lesion in question    Post-Op Condition: Stable    Disposition: to recovery room    Description of Procedure:   Ms. Michael De La Torre is a 74 y. o. woman with a clinical T is vs 1 Nxright breast cancer. Adjacent to this is a site of benign biopsy of papillary metaplasia recommended to be included in excision. She has elected to proceed with right breast conservation therapy and selective lymph node dissection. The indications for the planned procedure, along with the potential benefits and risks which include but are not limited to the risk of anesthesia, bleeding, infection, possible failed operation, possible need for additional surgery pending final pathologic assessment, lymphedema, sensation changes, and unappealing cosmetics were reviewed. We discussed alternative options. All questions were answered and she agrees to proceed. Ms. Michael De La Torre was met by me in the preoperative area.   The surgical site was identified. Consent was obtained. The appropriate breast imaging was reviewed. She underwent an image guided localization procedure preoperatively which was performed by the on site radiologist. The right breast lesion was again noted with the biopsy clip. The localizer wires are in good position. She was brought to the operating room and placed supine with her arms extended on boards. She was appropriately positioned and padded. Compression stockings were placed. Appropriate antibiotics were administered within 60 minutes of the incision. Breast imaging was available in the room. After induction of general anesthesia, the appropriate World Health Organization timeout procedure was performed. At 8349 0.5 millicuries of Lymphoseek technetium-99 sulfur colloid was injected intradermally in a periareolar location at 12:00, 3:00, 6:00, and 9:00. A neoprobe was then used to identify a hot spot. The location was marked. This initial count was 8. After this 5 mL of  isosulfuran blue dye for lymphatic mapping was injected intraparenchymally at 1004. A five minute massage was performed. The right breast and axillary region, upper arm, and chest wall were prepped and draped in the normal sterile fashion. Attention was then turned to the breast for the partial mastectomy. There were two localizer wires placed by the mammographer marking the right breast cancer and benign site. The skin and soft tissues over the proposed incision were infiltrated with local. A curvilinear incision was made at the 9-10:00 location. Flaps were raised and dissection was carried out in a rectangular fashion around the localizer wires and targets. An ellipse of skin was removed as anterior margin. Skin marked anterior margin . Dissection was not carried to the chest wall. Dissection was carried out carefully to try and maintain the localizer centrally within the specimen.  The specimen was excised and gross examination revealed adequate margins. .  The specimen was oriented with a margin map. It was submitted for specimen radiography which revealed the lesion in question with adequate radiographic margins. An additional margin was obtained medially with a stitch on the new medial margin. The wound was irrigated and hemostasis was obtained. In order to obtain the best cosmesis and contour while closing the defect within surgical cavity I performed a tissue rearrangement. A separate tissue incisions were made to advance tissue into the defect. Flaps were elevated and advanced for a total area of 12 square cm. Once tissue mobilization was complete, the dimensions of the cavity were assessed. A three-dimensional BioZorb implant was then placed in the prior tumor bed to facilitate potential radiation treatment and future follow-up surveillance. The implant was then seated and sutured into place at multiple points of contact with a 3-0 Vicryl stitch. The previously mobilized tissue was then reapproximated to enclose the implant and re-create the natural breast cosmesis. Hemostasis was reassessed. The incision was closed in layers using a 3-0 vicryl stitch followed by a 4-0 monocryl subcuticular approximation. Skin glue was applied. The skin and soft tissues over the proposed right axillary incision were infiltrated with local. There was not a strong signal of Tc in the axilla. An axillary incision was then made and carried through the skin, soft tissues, a portion of the clavipectoral fascia and into the deep axilla. Upon entering the axilla, the neoprobe was utilized to guide localization of the sentinel nodes. [place] node 1 was noted to be at level 1 and excised from the surrounding subcutaneous tissues using blunt dissection and electrocautery. Small lymphatics and vascular structures were identified and controlled with electrocautery and surgical clips. Ex-vivo counts of sentinel node 1 were 18, it was blue, and was palpable. There was 1 additional sentinel nodes identified and removed in a similar fashion. [place] node 2 was located at level 1, ex-vivo counts were 0, it was blue, and was palpable. All nodes were placed into containers, labeled, and submitted to pathology. [place] count following removal of the final sentinel node was 3. The axilla was then assessed for other blue channels/nodes and palpably abnormal lymph nodes. Blue nodes, non colored nodes extending from dye filled channels, radioactive and palpably suspicious nodes were removed. The axilla was then assessed for hemostasis. Cautery, surgical clips, and hemostatic agents were utilized as necessary. The wound was irrigated. The incision was closed in layers using a 3-0 vicryl stitch followed by a 3-0 and 4-0 monocryl dermal and subcuticular approximation with overlying skin glue. The instrument, sponge, and needle counts were correct. Points noted above:  Operation performed with curative intent: Yes  Tracer(s) used to identify sentinel nodes in the upfront surgery (nonneoadjuvant) setting (select all that apply) : Dye and Radioactive tracer  Tracer(s) used to identify sentinel nodes in the neoadjuvant setting (select all that apply): Not Applicable  All nodes (colored or noncolored) present at the end of a dye-filled lymphatic channel were removed: Yes  All significantly radioactive nodes were removed: Yes  All palpably suspicious nodes were removed: Yes  Biopsy-proven positive nodes marked with clips prior to chemotherapy were identified and removed: Not Applicable        Ms. Cosby was awakened and placed into a surgical bra. She was taken to the recovery room in stable condition. Her family was notified of intraoperative findings.       Electronically signed by Frandy Kc MD on 3/8/23 at 9:27 AM EST

## 2023-03-08 NOTE — PROGRESS NOTES
Teaching/ education completed for home care including pain management, wound care,activity,safety precautions and infection control. Patient and son verbalized understanding. Discharge instructions reviewed with patient/responsible adult. All home medications have been reviewed, questions answered and patient verbalized understanding. Discharge instructions signed and copies given. Patient discharged  per w/cwith belongings.

## 2023-03-08 NOTE — INTERVAL H&P NOTE
H&P Update    The patient's most recent H&P, office notes, breast imaging, and pathology were reviewed. Patient examined and laterality marked. There has been no changes. We will plan to proceed with a right partial mastectomy with excisional biopsy and right sentinel lymph node biopsy .     Keya Gillette MD

## 2023-03-08 NOTE — DISCHARGE INSTRUCTIONS
Postoperative Instructions for Breast Surgery  Merle Berrios MD [address] [place] [address], [address] [place] [address], [address] [place] [address] Drive  ([phone])    These are general guidelines to help assist in recovery after breast surgery. Please keep in mind all patients recover differently, so please call the office with any questions ([phone]). General guidelines   Rest when you feel tired  You will have a surgical bra on when you wake up. Please wear this day and night until your postoperative appointment. It can be removed for laundering and for showers. This helps immobilize the breast to alleviate discomfort as well as maintain pressure to avoid postoperative seroma. If you had a sentinel lymph node procedure, it is common to have an interval of blue urine and/or stool and blue skin changes of the breast.   Final pathology will take 3-5 days to result. The breast surgery office will call you with the results when they are known. Pain management  You may feel mild to moderate discomfort when anesthesia wears off  You will be given a prescription for a narcotic pain medication  Some patients experience very little discomfort and they prefer not to use the narcotic  You may take Tylenol or extra strength Tylenol instead of the narcotic  Many narcotics also contained Tylenol so you should not take both  AVOID aspirin, fish oil, Excedrin, Motrin, ibuprofen, and other NSAIDS immediately after surgery for at least 48-72 hours. Anticoagulation such as warfarin can typically resume 48 hours after surgery. This should be discussed with your surgeon and prescribing physician. Narcotic medication may cause constipation. Make sure to keep well hydrated, ambulate, and you may eat high-fiber foods to help avoid constipation. You may use over-the-counter medications for constipation. You should not consume alcohol while taking narcotics  You should not drive while taking narcotics  Pain should improve, not worsen as days pass.  If pain worsens, please call the office immediately. Wound care  Your incision has dissolvable stitches under the skin and surgical strength skin glue. You are also covered with a surgical bra and fluff padding. Wearing the bra helps reduce swelling and pain. Please wear it day and night until your postoperative appointment with the exception of laundering and bathing. If the surgical bra is uncomfortable for you, please wear a sports bra that applies compression. Do not place ointment or lotions on your incision  It is okay to remove the fluff padding after 24-48 hours  You may shower in 24-48 hours. The water may run over your wounds but do not scrub the surgical glue. The incisions dry after showering with a clean towel. Do not take a tub bath where your incision will be submerged into water until it is fully healed in 4-8 weeks   Do not swim with a fresh incision  Swelling and bruising in the surgical site is considered normal. It is not normal to have excessive bleeding or drainage from the wound. If you notice this, please call the office immediately. Please call the office if you notice a fever as this may be a sign of infection. If you had surgical drains placed, empty them 2-3 times a day or when the bulb is full. Please record the amount drained and color daily. This is used to identify when they can be removed in the office. A home care nurse may be assigned to check in on your progress. Activity  You may resume most daily activities the day after surgery  Avoid strenuous activity, heavy lifting (5-7 pounds), and vigorous exercise until seen at your postoperative appointment  Walking is in normal activity that can be restarted right away  Avoid any direct trauma to the surgical area  You may resume driving when you are no longer taking narcotics, pain free, and you feel safe turning the wheel and stopping quickly  Everyone recovers at different paces.  You may be able to return to work in the next one to several weeks. Most people return to work in 2-4 weeks, however, this depends on your type of work and overall health. For patients undergoing mastectomy and/or axillary dissection, you may begin arm exercises that after surgery. It is important to start slowly and gradually increase your activity. You may be referred to physical therapy for additional exercises. If you have a drain in place, be cautious of this and only perform light activity. If you had reconstructive surgery, please discuss activity limitations with your plastic surgeon    Diet  You may encounter nausea following surgery. Drink clear liquids or popsicles until you are feeling better. You have no diet restrictions and may resume a regular healthy diet immediately. You may add fiber or any over the counter stool softener/laxative to your diet to help with constipation incurred by narcotic pain medications. You can resume all of your regular medications immediately. He should discuss with her physician one to resume blood thinners. Follow-up  A follow-up appointment has been made for you. If you don't know at this appointment date is please call the office at ([phone]). When to contact us  Please call the surgical office immediately if you notice any of the following: Excessive pain, persistent fever, excessive bleeding or swelling, redness surrounding the wound, drainage from the wound, reactions to medications, or any general concerns or worsening of overall condition. The breast surgery office can be reached at ([phone]). ANESTHESIA DISCHARGE INSTRUCTIONS    Wear your seatbelt home. You are under the influence of drugs-do not drink alcohol, drive, operate machinery, make any important decisions or sign any legal documents for 24 hours. A responsible adult needs to be with you for 24 hours. You may experience lightheadedness, dizziness, or sleepiness following surgery.   Rest at home today- increase activity as tolerated. Progress slowly to a regular diet unless your physician has instructed you otherwise. Drink plenty of water. If persistent nausea and vomiting becomes a problem, call your physician. Coughing, sore throat and muscle aches are other side effects of anesthesia, and should improve with time. Do not drive or operate machinery while taking narcotics.

## 2023-03-08 NOTE — ANESTHESIA PRE PROCEDURE
Department of Anesthesiology  Preprocedure Note       Name:  Maricarmen Herndon   Age:  74 y.o.  :  1950                                          MRN:  [id number]         Date:  3/8/2023      Surgeon: Kailey Wan):  Kaitlynn Hernandez MD    Procedure: Procedure(s):  LOCALIZED RIGHT BREAST PARTIAL MASTECTOMY,  RIGHT SENTINEL LYMPH NODE BIOPSY, TECHNETIUM NINETY-NINE & INJECTABLE BLUE DYE IN OPERATING ROOM; BIOZORB,    Medications prior to admission:   Prior to Admission medications    Medication Sig Start Date End Date Taking?  Authorizing Provider   fluticasone-salmeterol (ADVAIR) 100-50 MCG/DOSE diskus inhaler Inhale 1 puff into the lungs 2 times daily 21   Historical Provider, MD       Current medications:    Current Facility-Administered Medications   Medication Dose Route Frequency Provider Last Rate Last Admin    lactated ringers IV soln infusion   IntraVENous Continuous Kaitlynn Hernandez MD        sodium chloride flush 0.9 % injection 5-40 mL  5-40 mL IntraVENous 2 times per day Kaitlynn Hernandez MD        sodium chloride flush 0.9 % injection 5-40 mL  5-40 mL IntraVENous PRN Kaitlynn Hernandez MD        0.9 % sodium chloride infusion   IntraVENous PRN Kaitlynn Hernandez MD        ceFAZolin (ANCEF) 2,000 mg in sodium chloride 0.9 % 50 mL IVPB (mini-bag)  2,000 mg IntraVENous On Call to [address] [place] [address] MD           Allergies:  No Known Allergies    Problem List:    Patient Active Problem List   Diagnosis Code    Renal calculus, right N20.0    Pyelonephritis N12       Past Medical History:        Diagnosis Date    Asthma     Kidney stones        Past Surgical History:        Procedure Laterality Date    ELBOW FRACTURE SURGERY Right     WITH LEFT ANKLE FRACTURE SURGERY     FEMUR SURGERY Right     FRACTURE    FRACTURE SURGERY      RIGHT ELBOW AND LEFT ANKLE    HARDWARE REMOVAL      SCREW REMOVAL FROM FEMUR    IR URETERAL CATHETER OR STENT PLACEMENT  3/17/2021    IR URETERAL CATHETER OR STENT PLACEMENT 3/17/2021 [facility]    JOINT REPLACEMENT Left     KIDNEY REMOVAL Right 3/17/2021    RIGHT PERCUTANEOUS NEPHROLITHOTOMY, CYSTOSCOPY performed by Satish Ramírez DO at [facility] [place] [address] Right 1/30/2023    US BREAST BIOPSY NEEDLE ADDITIONAL RIGHT 1/30/2023 [facility]    US BREAST BIOPSY W LOC DEVICE 1ST LESION RIGHT Right 1/30/2023    US BREAST BIOPSY W LOC DEVICE 1ST LESION RIGHT 1/30/2023 [facility]       Social History:    Social History     Tobacco Use    Smoking status: Never    Smokeless tobacco: Never   Substance Use Topics    Alcohol use: Not Currently                                Counseling given: Not Answered      Vital Signs (Current):   Vitals:    03/07/23 0910   Weight: 165 lb (74.8 kg)                                              BP Readings from Last 3 Encounters:   02/09/23 (!) 160/91   01/30/23 (!) 97/47   12/28/21 (!) 171/94       NPO Status:                                                                                 BMI:   Wt Readings from Last 3 Encounters:   03/07/23 165 lb (74.8 kg)   02/09/23 163 lb (73.9 kg)   12/27/21 168 lb 14 oz (76.6 kg)     Body mass index is 29.23 kg/m².     CBC:   Lab Results   Component Value Date/Time    WBC 9.0 12/28/2021 05:31 AM    RBC 3.88 12/28/2021 05:31 AM    HGB 11.6 12/28/2021 05:31 AM    HCT 34.9 12/28/2021 05:31 AM    MCV 89.8 12/28/2021 05:31 AM    RDW 13.4 12/28/2021 05:31 AM     12/28/2021 05:31 AM       CMP:   Lab Results   Component Value Date/Time     12/28/2021 05:31 AM    K 3.0 12/28/2021 05:31 AM     12/28/2021 05:31 AM    CO2 26 12/28/2021 05:31 AM    BUN 8 12/28/2021 05:31 AM    CREATININE 0.6 12/28/2021 05:31 AM    GFRAA >60 12/28/2021 05:31 AM    AGRATIO 0.8 12/28/2021 05:31 AM    LABGLOM >60 12/28/2021 05:31 AM    GLUCOSE 119 12/28/2021 05:31 AM    PROT 6.0 12/28/2021 05:31 AM    CALCIUM 8.6 12/28/2021 05:31 AM    BILITOT 0.9 12/28/2021 05:31 AM    ALKPHOS 215 12/28/2021 05:31 AM    AST 53 12/28/2021 05:31 AM    ALT 62 12/28/2021 05:31 AM       POC Tests: No results for input(s): POCGLU, POCNA, POCK, POCCL, POCBUN, POCHEMO, POCHCT in the last 72 hours. Coags:   Lab Results   Component Value Date/Time    PROTIME 15.5 12/26/2021 04:26 AM    INR 1.35 12/26/2021 04:26 AM    APTT 32.5 12/26/2021 04:26 AM       HCG (If Applicable): No results found for: PREGTESTUR, PREGSERUM, HCG, HCGQUANT     ABGs: No results found for: PHART, PO2ART, QEQ3DDJ, AGJ2DDA, BEART, J3UQHVLZ     Type & Screen (If Applicable):  No results found for: LABABO, LABRH    Drug/Infectious Status (If Applicable):  No results found for: HIV, HEPCAB    COVID-19 Screening (If Applicable):   Lab Results   Component Value Date/Time    COVID19 Not Detected 12/25/2021 04:57 PM    COVID19 Not Detected 03/12/2021 04:18 PM           Anesthesia Evaluation  Patient summary reviewed and Nursing notes reviewed no history of anesthetic complications:   Airway: Mallampati: III  TM distance: >3 FB   Neck ROM: full  Mouth opening: > = 3 FB   Dental: normal exam         Pulmonary:normal exam  breath sounds clear to auscultation  (+) COPD (daily maintenance inh, well controlled, no recent exacerbations): moderate,  asthma (mixed resp dz):     (-) sleep apnea and not a current smoker                           Cardiovascular:Negative CV ROS        (-) hypertension, past MI, CAD, CABG/stent, dysrhythmias,  angina and  CHF    ECG reviewed  Rhythm: regular  Rate: normal                    Neuro/Psych:   Negative Neuro/Psych ROS     (-) seizures, TIA and CVA           GI/Hepatic/Renal: Neg GI/Hepatic/Renal ROS       (-) GERD, liver disease and no renal disease       Endo/Other: Negative Endo/Other ROS       (-) diabetes mellitus, hypothyroidism, hyperthyroidism               Abdominal:   (+) obese,           Vascular:           Other Findings:           Anesthesia Plan      general     ASA 3       Induction: intravenous.    MIPS: Postoperative opioids intended and Prophylactic antiemetics administered.  Anesthetic plan and risks discussed with patient.      Plan discussed with CRNA.                    Karlo Solis MD   3/8/2023

## 2023-03-08 NOTE — PROGRESS NOTES
Received from OR - Unresponsive, oral airway,hue of blue to skin tone,simple mask @ 6 liters 98%,surgical bra dry and intact,semi fowlers,SCDS,vss.

## 2023-03-13 ENCOUNTER — TELEPHONE (OUTPATIENT)
Dept: SURGERY | Age: 73
End: 2023-03-13

## 2023-03-16 NOTE — TELEPHONE ENCOUNTER
Notified patient of pathology results. No questions at this time. Encouraged to call back if any arise.        Thanks,Dinora

## 2023-03-25 NOTE — PROGRESS NOTES
PCP:  Medical Oncology: Amairani Mireles  Radiation:  Other:      pT1cN0  STAGE:  IA right breast cancer      Ms. Anna Masters is a 74y.o.-year-old woman who is now s/p right partial mastectomy with sentinel lymph node biospy for right breast cancer. She underwent this procedure on  3/8/2023  and tolerated it well. She is doing quite well postoperatively and her pain is continuing to improve. INTERVAL HISTORY:  On 3/8/2023 she underwent right breast partial mastectomy with sentinel lymph node biopsy. 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 Grade 2. ER positive WA positive HER2 negative. Margins were negative. There was 0/2 lymph nodes involved carcinoma. [id number]     Pathology:    Department of Pathology   FINAL SURGICAL PATHOLOGY REPORT   Patient Name:  Parker Oquendo                 Accession No:  [id number]    Age Sex:   1950    74 Y / F       Location:      [facility]   Account No:    [de-identified]                  Collected:     2023   Med Rec No:    [id number]                 Received:      2023   Attend Phys:   Jeane Lord             Completed:     03/10/2023   Perform Phys:  Clotilde LANGE             FINAL DIAGNOSIS:     A. 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   - Negative for classic invasive carcinoma. B. [place] lymph node, right #1, biopsy:   - One lymph node; negative for metastatic carcinoma (0/1). C.  [place] lymph node, right #2, biopsy:   - One lymph node; negative for metastatic carcinoma (0/1). D.  Right breast, medial margin, reexcision:   - Benign breast tissue. E. Right breast, skin margin:   - Benign skin with papillary imaginations.      Procedure: Excision with wire-guided localization   Specimen Laterality: Right   TUMOR   + Tumor Site: Not specified     Histologic Type:     Solid papillary carcinoma without invasive carcinoma   Size

## 2023-03-27 ENCOUNTER — OFFICE VISIT (OUTPATIENT)
Dept: SURGERY | Age: 73
End: 2023-03-27

## 2023-03-27 ENCOUNTER — HOSPITAL ENCOUNTER (OUTPATIENT)
Dept: GENERAL RADIOLOGY | Age: 73
Discharge: HOME | End: 2023-03-27
Payer: MEDICARE

## 2023-03-27 VITALS
OXYGEN SATURATION: 95 % | HEIGHT: 63 IN | RESPIRATION RATE: 18 BRPM | HEART RATE: 85 BPM | SYSTOLIC BLOOD PRESSURE: 139 MMHG | BODY MASS INDEX: 28.53 KG/M2 | DIASTOLIC BLOOD PRESSURE: 79 MMHG | WEIGHT: 161 LBS

## 2023-03-27 DIAGNOSIS — C50.911 PRIMARY BREAST MALIGNANCY, RIGHT (HCC): Primary | ICD-10-CM

## 2023-03-27 DIAGNOSIS — C50.411 MALIGNANT NEOPLASM OF UPPER-OUTER QUADRANT OF RIGHT BREAST IN FEMALE, ESTROGEN RECEPTOR POSITIVE (HCC): ICD-10-CM

## 2023-03-27 DIAGNOSIS — Z17.0 MALIGNANT NEOPLASM OF UPPER-OUTER QUADRANT OF RIGHT BREAST IN FEMALE, ESTROGEN RECEPTOR POSITIVE (HCC): ICD-10-CM

## 2023-03-27 DIAGNOSIS — Z91.89 AT RISK FOR BONE DENSITY LOSS: ICD-10-CM

## 2023-03-27 DIAGNOSIS — Z09 POSTOP CHECK: ICD-10-CM

## 2023-03-27 DIAGNOSIS — Z79.811 AROMATASE INHIBITOR USE: ICD-10-CM

## 2023-03-27 PROCEDURE — 77080 DXA BONE DENSITY AXIAL: CPT

## 2023-03-27 PROCEDURE — 99024 POSTOP FOLLOW-UP VISIT: CPT | Performed by: SURGERY

## 2023-06-29 ENCOUNTER — OFFICE VISIT (OUTPATIENT)
Dept: SURGERY | Age: 73
End: 2023-06-29
Payer: MEDICARE

## 2023-06-29 VITALS
WEIGHT: 163 LBS | RESPIRATION RATE: 18 BRPM | SYSTOLIC BLOOD PRESSURE: 143 MMHG | HEIGHT: 63 IN | OXYGEN SATURATION: 97 % | DIASTOLIC BLOOD PRESSURE: 83 MMHG | HEART RATE: 73 BPM | BODY MASS INDEX: 28.88 KG/M2

## 2023-06-29 DIAGNOSIS — Z12.39 SCREENING BREAST EXAMINATION: ICD-10-CM

## 2023-06-29 DIAGNOSIS — Z08 ENCOUNTER FOR FOLLOW-UP SURVEILLANCE OF BREAST CANCER: ICD-10-CM

## 2023-06-29 DIAGNOSIS — Z85.3 ENCOUNTER FOR FOLLOW-UP SURVEILLANCE OF BREAST CANCER: ICD-10-CM

## 2023-06-29 DIAGNOSIS — Z85.3 PERSONAL HISTORY OF BREAST CANCER: Primary | ICD-10-CM

## 2023-06-29 PROCEDURE — 99213 OFFICE O/P EST LOW 20 MIN: CPT | Performed by: SURGERY

## 2023-06-29 PROCEDURE — 1123F ACP DISCUSS/DSCN MKR DOCD: CPT | Performed by: SURGERY

## 2023-06-29 RX ORDER — ANASTROZOLE 1 MG/1
TABLET ORAL
COMMUNITY
Start: 2023-04-12

## 2023-07-03 DIAGNOSIS — Z85.3 PERSONAL HISTORY OF BREAST CANCER: ICD-10-CM

## 2023-07-03 DIAGNOSIS — Z85.3 ENCOUNTER FOR FOLLOW-UP SURVEILLANCE OF BREAST CANCER: Primary | ICD-10-CM

## 2023-07-03 DIAGNOSIS — Z08 ENCOUNTER FOR FOLLOW-UP SURVEILLANCE OF BREAST CANCER: Primary | ICD-10-CM

## 2023-08-05 ENCOUNTER — HOSPITAL ENCOUNTER (EMERGENCY)
Age: 73
Discharge: HOME | End: 2023-08-05
Attending: EMERGENCY MEDICINE
Payer: MEDICARE

## 2023-08-05 VITALS
RESPIRATION RATE: 16 BRPM | BODY MASS INDEX: 30.03 KG/M2 | HEART RATE: 77 BPM | WEIGHT: 169.5 LBS | TEMPERATURE: 99.1 F | SYSTOLIC BLOOD PRESSURE: 178 MMHG | HEIGHT: 63 IN | OXYGEN SATURATION: 94 % | DIASTOLIC BLOOD PRESSURE: 100 MMHG

## 2023-08-05 DIAGNOSIS — Z23 TETANUS TOXOID VACCINATION ADMINISTERED AT CURRENT VISIT: ICD-10-CM

## 2023-08-05 DIAGNOSIS — S01.81XA LACERATION OF FOREHEAD, INITIAL ENCOUNTER: Primary | ICD-10-CM

## 2023-08-05 PROCEDURE — 90715 TDAP VACCINE 7 YRS/> IM: CPT | Performed by: EMERGENCY MEDICINE

## 2023-08-05 PROCEDURE — 6360000002 HC RX W HCPCS: Performed by: EMERGENCY MEDICINE

## 2023-08-05 PROCEDURE — 99284 EMERGENCY DEPT VISIT MOD MDM: CPT

## 2023-08-05 PROCEDURE — 12042 INTMD RPR N-HF/GENIT2.6-7.5: CPT

## 2023-08-05 PROCEDURE — 90471 IMMUNIZATION ADMIN: CPT | Performed by: EMERGENCY MEDICINE

## 2023-08-05 RX ORDER — ANASTROZOLE 1 MG/1
1 TABLET ORAL
COMMUNITY
Start: 2023-03-29

## 2023-08-05 RX ORDER — DENOSUMAB 60 MG/ML
60 INJECTION SUBCUTANEOUS
COMMUNITY
Start: 2023-10-10

## 2023-08-05 RX ADMIN — TETANUS TOXOID, REDUCED DIPHTHERIA TOXOID AND ACELLULAR PERTUSSIS VACCINE, ADSORBED 0.5 ML: 5; 2.5; 8; 8; 2.5 SUSPENSION INTRAMUSCULAR at 18:58

## 2023-08-05 ASSESSMENT — PAIN - FUNCTIONAL ASSESSMENT
PAIN_FUNCTIONAL_ASSESSMENT: 0-10
PAIN_FUNCTIONAL_ASSESSMENT: 0-10

## 2023-08-05 ASSESSMENT — PAIN DESCRIPTION - LOCATION: LOCATION: HEAD

## 2023-08-05 ASSESSMENT — PAIN SCALES - GENERAL
PAINLEVEL_OUTOF10: 0
PAINLEVEL_OUTOF10: 0

## 2023-08-05 ASSESSMENT — PAIN DESCRIPTION - ORIENTATION: ORIENTATION: RIGHT

## 2023-08-05 ASSESSMENT — PAIN DESCRIPTION - PAIN TYPE: TYPE: ACUTE PAIN

## 2023-08-05 ASSESSMENT — LIFESTYLE VARIABLES
HOW MANY STANDARD DRINKS CONTAINING ALCOHOL DO YOU HAVE ON A TYPICAL DAY: PATIENT DOES NOT DRINK
HOW OFTEN DO YOU HAVE A DRINK CONTAINING ALCOHOL: NEVER

## 2023-08-05 NOTE — ED NOTES
Dr Sidney Barros to cleanse wound to right face- and place local anesthetic with sutures placed     Ivan Cedeño RN  08/05/23 3525

## 2023-08-05 NOTE — ED PROVIDER NOTES
[facility]    Pt Name: Gustabo Bumpers   MRN: [id number]   [facility] 1950   Date of evaluation: 8/5/2023   Provider: Saba Zelaya MD   PCP: Hanny Morton MD   Note Started: 6:42 PM EDT 8/5/23       CHIEF COMPLAINT  Laceration (Laceration to right forehead approx 4:15 pm today onset when head butt with pitbull dog- patient leaning over to pet dog and dog raised up same time striking heads together)       HISTORY OF PRESENT ILLNESS  Gustabo Bumpers is a 74 y.o. female who  has a past medical history of Asthma and Kidney stones. who presents to the ED with a right forehead laceration. Patient was trying to pet a dog. The dog did not bite her or attack her. He was just happy and playful and jumped up striking her in the head. She presented with a laceration to her right forehead but no loss of consciousness no neck pain and she is not on any blood thinners. She did decline a CAT scan of the head. Last tetanus was about 7 years ago. No headache nausea vomiting loss of consciousness. No other symptoms.     I have reviewed the following from the nursing documentation:        HISTORY :      Past Medical History:   Diagnosis Date    Asthma     Kidney stones      Past Surgical History:   Procedure Laterality Date    ELBOW FRACTURE SURGERY Right     WITH LEFT ANKLE FRACTURE SURGERY     FEMUR SURGERY Right     FRACTURE    FRACTURE SURGERY      RIGHT ELBOW AND LEFT ANKLE    HARDWARE REMOVAL      SCREW REMOVAL FROM FEMUR    IR URETERAL CATHETER OR STENT PLACEMENT  3/17/2021    IR URETERAL CATHETER OR STENT PLACEMENT 3/17/2021 [facility] SPECIAL PROCEDURES    JOINT REPLACEMENT Left     KIDNEY REMOVAL Right 3/17/2021    RIGHT PERCUTANEOUS NEPHROLITHOTOMY, CYSTOSCOPY performed by Armando Taylor DO at [address] [place] [address] Right 3/8/2023    LOCALIZED RIGHT BREAST PARTIAL MASTECTOMY WITH RIGHT EXCISIONAL BREAST BIOPSY, RIGHT SENTINEL LYMPH NODE BIOPSY, TECHNETIUM NINETY-NINE & INJECTABLE Laceration of forehead, initial encounter    2. Tetanus toxoid vaccination administered at current visit        DISPOSITION  Decision To Discharge 08/05/2023 06:50:16 PM     Germán Amador MD    Note: This chart was created using voice recognition dictation software. Efforts were made by me to ensure accuracy, however some errors may be present due to limitations of this technology and occasionally words are not transcribed correctly.         Germán Amador MD  08/05/23 [id number]

## 2023-08-05 NOTE — ED TRIAGE NOTES
Patient to [address] [facility] ambulatory with laceration to right forehead above eyebrow- onset today around 4:15 pm- states leaning over to pet dog and dog raised head up at same time striking heads together- denies LOC bleeding controlled

## 2023-12-28 NOTE — PROGRESS NOTES
PCP:  Medical Oncology: Philippe  Radiation: Re  Other:        pT1cN0  STAGE:  IA right breast cancer      Ms. Cosby is a 73 y.o.-year-old woman who initially presented to me with  right breast cancer.  Since her last encounter Ms. Cosby has been doing quite well.  Her adjuvant treatment has included radiation and endocrine therapy.  She has discontinued her endocrine therapy due to dizziness and sleepiness.  She has no new breast related concerns today.        INTERVAL HISTORY:  On 3/8/2023 she underwent right breast partial mastectomy with sentinel lymph node biopsy.  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  ER positive AR positive HER2 negative.  Margins were negative.  There was 0/2 lymph nodes involved carcinoma.  [id number]     On 6/30/2023 she will complete adjuvant radiation.    She has initiated anastrozole --> 2023    On 1/4/2024 she underwent bilateral breast imaging.  There are new postsurgical changes noted in the right breast.  There are no concerning findings identified in the bilateral breast suspicious for malignancy.  BI-RADS 2.      Exam:  Physical exam has been reviewed and updated  General: no acute distress  Breast:  The patient was examined in the upright and supine position. There is a well healed scar on the right breast. There is a similarly well healed ipsilateral axillary scar. There are expected  post surgical and radiation related changes.  This includes some firmness in the surgical bed.  She has good range of motion with her arm.  Her contralateral breast shows no new masses or changes in breast contour.  There were no skin changes of the breast or nipple areolar complex.  There was no nipple inversion or discharge.   There is no axillary lymphadenopathy palpated bilaterally.  Respiratory: respirations are non-labored and there is no audible distress  Cardiovascular: regular rate, extremities appear well perfused  Neurologic: alert,

## 2024-01-04 ENCOUNTER — HOSPITAL ENCOUNTER (OUTPATIENT)
Dept: WOMENS IMAGING | Age: 74
Discharge: HOME | End: 2024-01-04
Payer: MEDICARE

## 2024-01-04 ENCOUNTER — OFFICE VISIT (OUTPATIENT)
Dept: SURGERY | Age: 74
End: 2024-01-04
Payer: MEDICARE

## 2024-01-04 VITALS — BODY MASS INDEX: 28.35 KG/M2 | WEIGHT: 160 LBS | HEIGHT: 63 IN

## 2024-01-04 VITALS — WEIGHT: 164 LBS | SYSTOLIC BLOOD PRESSURE: 134 MMHG | BODY MASS INDEX: 29.05 KG/M2 | DIASTOLIC BLOOD PRESSURE: 84 MMHG

## 2024-01-04 DIAGNOSIS — Z85.3 ENCOUNTER FOR FOLLOW-UP SURVEILLANCE OF BREAST CANCER: ICD-10-CM

## 2024-01-04 DIAGNOSIS — Z08 ENCOUNTER FOR FOLLOW-UP SURVEILLANCE OF BREAST CANCER: ICD-10-CM

## 2024-01-04 DIAGNOSIS — Z85.3 PERSONAL HISTORY OF BREAST CANCER: ICD-10-CM

## 2024-01-04 DIAGNOSIS — Z12.39 SCREENING BREAST EXAMINATION: ICD-10-CM

## 2024-01-04 DIAGNOSIS — Z85.3 PERSONAL HISTORY OF BREAST CANCER: Primary | ICD-10-CM

## 2024-01-04 PROCEDURE — G0279 TOMOSYNTHESIS, MAMMO: HCPCS

## 2024-01-04 PROCEDURE — 1123F ACP DISCUSS/DSCN MKR DOCD: CPT | Performed by: SURGERY

## 2024-01-04 PROCEDURE — 99214 OFFICE O/P EST MOD 30 MIN: CPT | Performed by: SURGERY

## 2024-03-14 NOTE — ANESTHESIA POSTPROCEDURE EVALUATION
Department of Anesthesiology  Postprocedure Note    Patient: Deisy Steele  MRN: [id number]  YOB: 1950  Date of evaluation: 3/8/2023      Procedure Summary     Date: 03/08/23 Room / Location: [address]    Anesthesia Start: 8544 Anesthesia Stop: 6067    Procedure: LOCALIZED RIGHT BREAST PARTIAL MASTECTOMY WITH RIGHT EXCISIONAL BREAST BIOPSY, RIGHT SENTINEL LYMPH NODE BIOPSY, TECHNETIUM NINETY-NINE & INJECTABLE BLUE DYE IN OPERATING ROOM; BIOZORB. (Right: Breast) Diagnosis:       Primary breast malignancy, right ([address]      (Primary breast malignancy, right ([address] Pema Rogers)    Surgeons: Lane Bales MD Responsible Provider: Elli Larios MD    Anesthesia Type: general ASA Status: 3          Anesthesia Type: No value filed.     Alexander Phase I: Alexander Score: 4    Alexander Phase II:        Anesthesia Post Evaluation    Patient location during evaluation: PACU  Patient participation: complete - patient participated  Level of consciousness: awake and alert  Airway patency: patent  Nausea & Vomiting: no vomiting and no nausea  Complications: no  Cardiovascular status: hemodynamically stable  Respiratory status: acceptable  Hydration status: stable IBW +10% for calculations given fluid shifts

## 2024-06-26 ENCOUNTER — HOSPITAL ENCOUNTER (EMERGENCY)
Age: 74
Discharge: HOME | End: 2024-06-26
Attending: EMERGENCY MEDICINE
Payer: MEDICARE

## 2024-06-26 VITALS
OXYGEN SATURATION: 98 % | HEART RATE: 82 BPM | RESPIRATION RATE: 18 BRPM | HEIGHT: 62 IN | BODY MASS INDEX: 29.66 KG/M2 | DIASTOLIC BLOOD PRESSURE: 62 MMHG | WEIGHT: 161.16 LBS | SYSTOLIC BLOOD PRESSURE: 112 MMHG | TEMPERATURE: 98 F

## 2024-06-26 DIAGNOSIS — R30.0 DYSURIA: ICD-10-CM

## 2024-06-26 DIAGNOSIS — R35.0 URINARY FREQUENCY: Primary | ICD-10-CM

## 2024-06-26 LAB
BACTERIA URNS QL MICRO: ABNORMAL /HPF
BILIRUB UR QL STRIP.AUTO: NEGATIVE
CLARITY UR: CLEAR
COLOR UR: YELLOW
EPI CELLS #/AREA URNS AUTO: 3 /HPF (ref 0–5)
GLUCOSE UR STRIP.AUTO-MCNC: NEGATIVE MG/DL
HGB UR QL STRIP.AUTO: ABNORMAL
HYALINE CASTS #/AREA URNS AUTO: 10 /LPF (ref 0–8)
KETONES UR STRIP.AUTO-MCNC: NEGATIVE MG/DL
LEUKOCYTE ESTERASE UR QL STRIP.AUTO: ABNORMAL
MUCUS: PRESENT
NITRITE UR QL STRIP.AUTO: NEGATIVE
PH UR STRIP.AUTO: 5.5 [PH] (ref 5–8)
PROT UR STRIP.AUTO-MCNC: 30 MG/DL
RBC CLUMPS #/AREA URNS AUTO: 6 /HPF (ref 0–4)
SP GR UR STRIP.AUTO: 1.02 (ref 1–1.03)
UA COMPLETE W REFLEX CULTURE PNL UR: ABNORMAL
UA DIPSTICK W REFLEX MICRO PNL UR: YES
URN SPEC COLLECT METH UR: ABNORMAL
UROBILINOGEN UR STRIP-ACNC: 1 E.U./DL
WBC #/AREA URNS AUTO: 4 /HPF (ref 0–5)

## 2024-06-26 PROCEDURE — 81001 URINALYSIS AUTO W/SCOPE: CPT

## 2024-06-26 PROCEDURE — 99283 EMERGENCY DEPT VISIT LOW MDM: CPT

## 2024-06-26 PROCEDURE — 51701 INSERT BLADDER CATHETER: CPT

## 2024-06-26 RX ORDER — CIPROFLOXACIN 500 MG/1
500 TABLET, FILM COATED ORAL 2 TIMES DAILY
Qty: 14 TABLET | Refills: 0 | Status: SHIPPED | OUTPATIENT
Start: 2024-06-26 | End: 2024-07-03

## 2024-06-26 ASSESSMENT — LIFESTYLE VARIABLES
HOW OFTEN DO YOU HAVE A DRINK CONTAINING ALCOHOL: NEVER
HOW MANY STANDARD DRINKS CONTAINING ALCOHOL DO YOU HAVE ON A TYPICAL DAY: PATIENT DOES NOT DRINK

## 2024-06-26 ASSESSMENT — PAIN - FUNCTIONAL ASSESSMENT: PAIN_FUNCTIONAL_ASSESSMENT: NONE - DENIES PAIN

## 2024-06-26 NOTE — ED NOTES
Provided patient with supplies and instruction to obtain urine sample. Patient returned from [facility] stating that she was unable to void at this time. She is currently drinking water.

## 2024-06-26 NOTE — ED TRIAGE NOTES
Pt states that she has been experiencing burning and frequency with urination since Sunday night. Pt endorses low grade fever. Pt AAO x 4.

## 2024-06-27 NOTE — DISCHARGE INSTRUCTIONS
Call today or tomorrow to follow up with Gerson Reyes MD  in 4-5 days.    Take your medication as indicated, if you are given an antibiotic then make sure you get the prescription filled and take the antibiotics until finished.  Drink plenty of fluids while taking the antibiotics.  Avoid drinking alcohol while taking antibiotics.     Use ibuprofen or Tylenol (unless prescribed medications that have Tylenol in it) for pain.  You can take over the counter Ibuprofen (advil) tablets (4 every 8 hours or 3 every 6 hours or 2 every 4 hours)    [employer], [employer] has some antibiotics for free; [facility] and [employer] has a 4 dollar prescription plan for some antibiotics.    Return to the Emergency Department for fever > 101.5, inability to urinate, burning when you urinate, increase in the number of times or if you have an urgency to urinate, any other care or concern.

## 2024-06-27 NOTE — ED NOTES
D/C: Order noted for d/c. Pt confirmed d/c paperwork has correct name. Discharge and education instructions reviewed with patient. Teach-back successful.  Pt verbalized understanding and denied questions at this time. No acute distress noted. Patient instructed to follow-up as noted - return to emergency department if symptoms worsen. Patient verbalized understanding. Discharged per EDMD with discharge instructions. Pt discharged to private vehicle. Patient stable upon departure. Thanked patient for [facility] for care. Provider aware of patient pain at time of discharge.

## 2024-07-24 ENCOUNTER — HOSPITAL ENCOUNTER (OUTPATIENT)
Dept: CT IMAGING | Age: 74
Discharge: HOME | End: 2024-07-24
Payer: MEDICARE

## 2024-07-24 DIAGNOSIS — Z87.442 PERSONAL HISTORY OF URINARY CALCULI: ICD-10-CM

## 2024-07-24 DIAGNOSIS — R10.9 ACUTE ABDOMINAL PAIN: ICD-10-CM

## 2024-07-24 PROCEDURE — 74176 CT ABD & PELVIS W/O CONTRAST: CPT

## 2024-09-23 NOTE — PROGRESS NOTES
PCP:  Medical Oncology: Philippe  Radiation: Re  Other:        pT1cN0  STAGE:  IA right breast cancer      Ms. Cosby is a 74 y.o.-year-old woman who initially presented to me with  right breast cancer.  Since her last encounter Ms. Cosby has been doing quite well.  Her adjuvant treatment has included radiation and endocrine therapy.  She has discontinued her endocrine therapy due to dizziness and sleepiness.  She has no new breast related concerns today. She was treated for a hepatic abscess over the summer but is feeling fine at this time.        INTERVAL HISTORY:  On 3/8/2023 she underwent right breast partial mastectomy with sentinel lymph node biopsy.  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  ER positive MD positive HER2 negative.  Margins were negative.  There was 0/2 lymph nodes involved carcinoma.  [id number]     On 6/30/2023 she will complete adjuvant radiation.    She has initiated anastrozole --> 2023    On 1/4/2024 she underwent bilateral breast imaging.  There are new postsurgical changes noted in the right breast.  There are no concerning findings identified in the bilateral breast suspicious for malignancy.  BI-RADS 2.      Exam:  Physical exam has been reviewed and updated  General: no acute distress  Breast:  The patient was examined in the upright and supine position. There is a well healed scar on the right breast. There is a similarly well healed ipsilateral axillary scar. There are expected  post surgical and radiation related changes.  This includes some firmness in the surgical bed.  She has good range of motion with her arm.  Her contralateral breast shows no new masses or changes in breast contour.  There were no skin changes of the breast or nipple areolar complex.  There was no nipple inversion or discharge.   There is no axillary lymphadenopathy palpated bilaterally.  Respiratory: respirations are non-labored and there is no audible distress  Cardiovascular:

## 2024-09-30 ENCOUNTER — OFFICE VISIT (OUTPATIENT)
Dept: SURGERY | Age: 74
End: 2024-09-30
Payer: MEDICARE

## 2024-09-30 VITALS
SYSTOLIC BLOOD PRESSURE: 124 MMHG | OXYGEN SATURATION: 96 % | DIASTOLIC BLOOD PRESSURE: 78 MMHG | HEART RATE: 84 BPM | WEIGHT: 154 LBS | BODY MASS INDEX: 27.29 KG/M2 | HEIGHT: 63 IN | RESPIRATION RATE: 16 BRPM

## 2024-09-30 DIAGNOSIS — Z85.3 ENCOUNTER FOR FOLLOW-UP SURVEILLANCE OF BREAST CANCER: ICD-10-CM

## 2024-09-30 DIAGNOSIS — Z08 ENCOUNTER FOR FOLLOW-UP SURVEILLANCE OF BREAST CANCER: ICD-10-CM

## 2024-09-30 DIAGNOSIS — Z85.3 PERSONAL HISTORY OF BREAST CANCER: Primary | ICD-10-CM

## 2024-09-30 DIAGNOSIS — Z09 SURGICAL FOLLOW-UP CARE: ICD-10-CM

## 2024-09-30 DIAGNOSIS — Z12.39 SCREENING BREAST EXAMINATION: ICD-10-CM

## 2024-09-30 PROCEDURE — 1123F ACP DISCUSS/DSCN MKR DOCD: CPT | Performed by: SURGERY

## 2024-09-30 PROCEDURE — 99214 OFFICE O/P EST MOD 30 MIN: CPT | Performed by: SURGERY

## 2024-10-01 DIAGNOSIS — Z12.31 ENCOUNTER FOR SCREENING MAMMOGRAM FOR MALIGNANT NEOPLASM OF BREAST: ICD-10-CM

## 2024-10-01 DIAGNOSIS — Z85.3 PERSONAL HISTORY OF BREAST CANCER: Primary | ICD-10-CM

## 2024-10-01 DIAGNOSIS — Z08 ENCOUNTER FOR FOLLOW-UP SURVEILLANCE OF BREAST CANCER: ICD-10-CM

## 2024-10-01 DIAGNOSIS — Z85.3 ENCOUNTER FOR FOLLOW-UP SURVEILLANCE OF BREAST CANCER: ICD-10-CM

## 2025-02-24 ENCOUNTER — HOSPITAL ENCOUNTER (EMERGENCY)
Age: 75
Discharge: HOME | End: 2025-02-24
Attending: EMERGENCY MEDICINE
Payer: MEDICARE

## 2025-02-24 ENCOUNTER — APPOINTMENT (OUTPATIENT)
Dept: GENERAL RADIOLOGY | Age: 75
End: 2025-02-24
Payer: MEDICARE

## 2025-02-24 ENCOUNTER — APPOINTMENT (OUTPATIENT)
Dept: CT IMAGING | Age: 75
End: 2025-02-24
Payer: MEDICARE

## 2025-02-24 VITALS
SYSTOLIC BLOOD PRESSURE: 179 MMHG | HEIGHT: 63 IN | DIASTOLIC BLOOD PRESSURE: 92 MMHG | BODY MASS INDEX: 27.7 KG/M2 | HEART RATE: 74 BPM | WEIGHT: 156.31 LBS | OXYGEN SATURATION: 98 % | RESPIRATION RATE: 16 BRPM | TEMPERATURE: 97.9 F

## 2025-02-24 DIAGNOSIS — S01.81XA FACIAL LACERATION, INITIAL ENCOUNTER: ICD-10-CM

## 2025-02-24 DIAGNOSIS — S02.2XXA CLOSED NONDISPLACED FRACTURE OF NASAL BONE, INITIAL ENCOUNTER: Primary | ICD-10-CM
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
## 2025-02-24 PROCEDURE — 70450 CT HEAD/BRAIN W/O DYE: CPT

## 2025-02-24 PROCEDURE — 72125 CT NECK SPINE W/O DYE: CPT

## 2025-02-24 PROCEDURE — 2500000003 HC RX 250 WO HCPCS

## 2025-02-24 PROCEDURE — 73110 X-RAY EXAM OF WRIST: CPT

## 2025-02-24 PROCEDURE — 6360000002 HC RX W HCPCS

## 2025-02-24 PROCEDURE — 73560 X-RAY EXAM OF KNEE 1 OR 2: CPT

## 2025-02-24 PROCEDURE — 70486 CT MAXILLOFACIAL W/O DYE: CPT

## 2025-02-24 PROCEDURE — 99284 EMERGENCY DEPT VISIT MOD MDM: CPT

## 2025-02-24 PROCEDURE — 6370000000 HC RX 637 (ALT 250 FOR IP)

## 2025-02-24 PROCEDURE — 12011 RPR F/E/E/N/L/M 2.5 CM/<: CPT

## 2025-02-24 RX ORDER — HYDROCODONE BITARTRATE AND ACETAMINOPHEN 5; 325 MG/1; MG/1
1 TABLET ORAL ONCE
Status: DISCONTINUED | OUTPATIENT
Start: 2025-02-24 | End: 2025-02-24 | Stop reason: HOSPADM

## 2025-02-24 RX ORDER — CEPHALEXIN 500 MG/1
500 CAPSULE ORAL 2 TIMES DAILY
Qty: 14 CAPSULE | Refills: 0 | Status: SHIPPED | OUTPATIENT
Start: 2025-02-24 | End: 2025-03-03

## 2025-02-24 RX ADMIN — Medication 3 ML: at 09:36

## 2025-02-24 RX ADMIN — CEFAZOLIN 2000 MG: 2 INJECTION, POWDER, FOR SOLUTION INTRAVENOUS at 10:41

## 2025-02-24 ASSESSMENT — PAIN SCALES - GENERAL: PAINLEVEL_OUTOF10: 0

## 2025-02-24 ASSESSMENT — PAIN DESCRIPTION - LOCATION: LOCATION: NOSE

## 2025-02-24 NOTE — ED PROVIDER NOTES
[facility] EMERGENCY DEPARTMENT  EMERGENCY DEPARTMENT ENCOUNTER        Pt Name: Valarie Cosby  MRN: [id number]  Birthdate 1950  Date of evaluation: 2/24/2025  Provider: Marie Sparrow PA-C  PCP: Gerson Reyes MD  Note Started: 9:09 AM EST 2/24/25       I have seen and evaluated this patient with my supervising physician Perfecto Nunez DO.      CHIEF COMPLAINT       Chief Complaint   Patient presents with    Fall     Pt arrives with c/o fall this morning, missed a step, mechanical fall. Pt has nasal lac, hematoma to lt forehead, reports - loc. No blood thinners. Lt hand pain. Lt knee pain.    Facial Injury       HISTORY OF PRESENT ILLNESS: 1 or more Elements     History From: Patient      Valarie Cosby is a 74 y.o. female who presents to the ED with a concern of a fall that happened this morning and she was going down to the basement and missed stepped and fell down 8 steps.  Said hit her head, no LOC, no anticoagulation use  Denies blood thinner use  Said has pain on the left wrist and left elbow  Denies headaches, dizziness, blurry vision, chest pain, shortness of breath, abdominal pain  No eye pain    Nursing Notes were all reviewed and agreed with or any disagreements were addressed in the HPI.    REVIEW OF SYSTEMS :      Review of Systems    Positives and Pertinent negatives as per HPI.     SURGICAL HISTORY     Past Surgical History:   Procedure Laterality Date    ELBOW FRACTURE SURGERY Right     WITH LEFT ANKLE FRACTURE SURGERY     FEMUR SURGERY Right     FRACTURE    FRACTURE SURGERY      RIGHT ELBOW AND LEFT ANKLE    HARDWARE REMOVAL      SCREW REMOVAL FROM FEMUR    IR GUIDED URETERAL STENT PLACE WO NEPHRO CATH NEW ACCESS  3/17/2021    IR URETERAL CATHETER OR STENT PLACEMENT 3/17/2021 [facility] SPECIAL PROCEDURES    JOINT REPLACEMENT Left     KIDNEY REMOVAL Right 3/17/2021    RIGHT PERCUTANEOUS NEPHROLITHOTOMY, CYSTOSCOPY performed by Eddi Conroy DO at [facility] OR    MASTECTOMY Right

## 2025-02-24 NOTE — ED PROVIDER NOTES
In addition to the advanced practice provider, I personally saw Valarie Cosby and performed a substantive portion of the visit including all aspects of the medical decision making.    Medical Decision Making  Patient seen and evaluated at bedside.  Briefly, patient presenting after a fall down the stairs.  She missed a step and then fell down approximate 5-8 steps.  She did hit her head, but denies any loss of consciousness.  Denies taking any blood thinners.  Reports last tetanus shot was about 3 years ago.    Patient given a dose of oral Norco for pain relief. CT head negative for acute intracranial hemorrhage.  CT cervical spine negative for acute fracture. CT facial bones did show a nondisplaced nasal fracture.  X-ray of the left wrist and left knee negative for acute fracture or dislocation.  Patient's wounds were extensively cleaned and her laceration was repaired with sutures by BLANK.  Given that patient had a laceration overlying her nasal fractures, she was given a dose of Ancef in the ED and also provided with a prescription for Keflex. She was provided with ENT follow-up. Patient is stable for discharge.    EKG  N/A    SEP-1  Is this patient to be included in the SEP-1 Core Measure due to severe sepsis or septic shock?   No     Exclusion criteria - the patient is NOT to be included for SEP-1 Core Measure due to:  2+ SIRS criteria are not met    Screenings     Santiago Coma Scale  Eye Opening: Spontaneous  Best Verbal Response: Oriented  Best Motor Response: Obeys commands  Dresser Coma Scale Score: 15             Patient Referrals:  Gerson Reyes MD  [id number]  [facility] [address] [phone]    Schedule an appointment as soon as possible for a visit in 1 week      [facility] Emergency Department  [address] [facility] [address]  [phone]    If symptoms worsen    Victor Hugo To MD  [address] [facility]  [address]  [facility]

## 2025-02-24 NOTE — DISCHARGE INSTRUCTIONS
You had a laceration to face repaired today. Keep the wound covered and dry for the first 24-48 hours.  After this, you may remove the dressing and wash gently with soap and water, but do not immerse the wound in water for 3 days. Wash the wound gently and then pat dry twice per day, you can apply a thin layer of antibiotic ointment afterwards.      Your sutures are absorbable and will not need to be removed. In 7 days you may start using antibiotic ointment to help dissolve the sutures on top of the skin.     Present for ENT provided, please call and establish care  Follow-up with PCP for assessment after the visit    Call your doctor or return to the ER for increasing redness, swelling, drainage of pus, fever, vomiting, or any other new or worsening symptoms or concerns.

## (undated) DEVICE — APPLICATOR MEDICATED 26 CC SOLUTION HI LT ORNG CHLORAPREP

## (undated) DEVICE — Device: Brand: PILLOW, GENTLETOUCH, 7 INCH RT

## (undated) DEVICE — PROBE SET W/ DRP

## (undated) DEVICE — GAUZE,SPONGE,4"X4",8PLY,STRL,LF,10/TRAY: Brand: MEDLINE

## (undated) DEVICE — MERCY HEALTH WEST TURNOVER: Brand: MEDLINE INDUSTRIES, INC.

## (undated) DEVICE — DRAPE,NEPHROSCOPY,STERILE: Brand: MEDLINE

## (undated) DEVICE — Z INACTIVE USE 2660664 SOLUTION IRRIG 3000ML 0.9% SOD CHL USP UROMATIC PLAS CONT

## (undated) DEVICE — LIEBERMAN INTRODUCER: Brand: LIEBERMAN

## (undated) DEVICE — NEEDLE,22GX1.5",REG,BEVEL: Brand: MEDLINE

## (undated) DEVICE — TUBING, SUCTION, 1/4" X 12', STRAIGHT: Brand: MEDLINE

## (undated) DEVICE — SUTURE VCRL + SZ 3-0 L27IN ABSRB UD L26MM SH 1/2 CIR VCP416H

## (undated) DEVICE — SPECIMEN ORIENTATION CHARMS, SIX DISTINCTLY SHAPED STERILE 10MM CHARMS: Brand: MARGINMAP

## (undated) DEVICE — SUTURE MCRYL + SZ 4-0 L27IN ABSRB UD L19MM PS-2 3/8 CIR MCP426H

## (undated) DEVICE — SYRINGE MED 10ML TRNSLUC BRL PLUNG BLK MRK POLYPR CTRL

## (undated) DEVICE — ADHESIVE SKIN CLSR 0.7ML TOP DERMBND ADV

## (undated) DEVICE — MASC TURNOVER KIT: Brand: MEDLINE INDUSTRIES, INC.

## (undated) DEVICE — MINOR SET UP PK

## (undated) DEVICE — GLOVE ORANGE PI 8   MSG9080

## (undated) DEVICE — SOLUTION IV IRRIG POUR BRL 0.9% SODIUM CHL 2F7124

## (undated) DEVICE — PROBE LITHO 12FR L3.76MM DISP SHOCKPULSE

## (undated) DEVICE — SOLUTION IRRIG 500ML 0.9% SOD CHLO USP POUR PLAS BTL

## (undated) DEVICE — HYPODERMIC SAFETY NEEDLE: Brand: MAGELLAN

## (undated) DEVICE — STAPLER SKIN H3.9MM WIRE DIA0.58MM CRWN 6.9MM 35 STPL ROT

## (undated) DEVICE — GOWN SIRUS NONREIN XL W/TWL: Brand: MEDLINE INDUSTRIES, INC.

## (undated) DEVICE — DRAPE,CHEST,FENES,15X10,STERIL: Brand: MEDLINE

## (undated) DEVICE — SYRINGE, LUER LOCK, 10ML: Brand: MEDLINE

## (undated) DEVICE — SPONGE GZ W4XL4IN COT 12 PLY TYP VII WVN C FLD DSGN

## (undated) DEVICE — PROTECTOR EYE PT SELF ADH NS OPT GRD LF

## (undated) DEVICE — GUIDEWIRE URO L150CM DIA0.035IN TAPR 8CM STR TIP STD SHFT

## (undated) DEVICE — GOWN,AURORA,NONREINF,RAGLAN,XXL,STERILE: Brand: MEDLINE

## (undated) DEVICE — INTENDED USE FOR SURGICAL MARKING ON INTACT SKIN, ALSO PROVIDES A PERMANENT METHOD OF IDENTIFYING OBJECTS IN THE OPERATING ROOM: Brand: WRITESITE® PLUS MINI PREP RESISTANT MARKER

## (undated) DEVICE — PENCIL SMK EVAC TELSCP 3 M TBNG

## (undated) DEVICE — SYRINGE MED 10ML LUERLOCK TIP W/O SFTY DISP

## (undated) DEVICE — TAPE ADH W3INXL10YD WHT PAPR GENTLE BRTH FLX COMFORTABLE

## (undated) DEVICE — APPLICATOR ENDOSCP CANN S STL STYL N DEHP FOR DELIVERING

## (undated) DEVICE — BLANKET WRM W40.2XL55.9IN IORT LO BODY + MISTRAL AIR

## (undated) DEVICE — MAJOR SET UP PK

## (undated) DEVICE — SPECIMEN COLLECTION 4-PORT MANIFOLD: Brand: NEPTUNE 2

## (undated) DEVICE — SYRINGE MED 5ML STD CLR PLAS LUERLOCK TIP N CTRL DISP

## (undated) DEVICE — GLOVE SURG SZ 7 L12IN THK7.5MIL DK GRN LTX FREE MSG6570] MEDLINE INDUSTRIES INC]

## (undated) DEVICE — Y-TYPE TUR/BLADDER IRRIGATION SET, REGULATING CLAMP

## (undated) DEVICE — GUIDEWIRE URO L145CM DIA0.035IN TIP L3.5CM PTFE FLX AMPLATZ

## (undated) DEVICE — DRAPE C ARM UNIV W41XL74IN CLR PLAS XR VELC CLSR POLY STRP

## (undated) DEVICE — PAD DRY FLOOR ABS 32X58IN GRN

## (undated) DEVICE — SUTURE PERMAHAND SZ 2-0 L18IN NONABSORBABLE BLK L26MM FS 685G

## (undated) DEVICE — PAD,ABDOMINAL,8"X7.5",STERILE,LF,1/PK: Brand: MEDLINE

## (undated) DEVICE — Device

## (undated) DEVICE — INTENDED FOR TISSUE SEPARATION, AND OTHER PROCEDURES THAT REQUIRE A SHARP SURGICAL BLADE TO PUNCTURE OR CUT.: Brand: BARD-PARKER ® STAINLESS STEEL BLADES

## (undated) DEVICE — COVER LT HNDL BLU PLAS

## (undated) DEVICE — GLOVE SURG SZ 6.5 L11.2IN FNGR THK9.8MIL STRW LTX POLYMER

## (undated) DEVICE — BLADE ES ELASTOMERIC COAT INSUL DURABLE BEND UPTO 90DEG

## (undated) DEVICE — YANKAUER,BULB TIP,W/O VENT,RIGID,STERILE: Brand: MEDLINE

## (undated) DEVICE — APPLIER CLP L9.38IN M LIG TI DISP STR RNG HNDL LIGACLP

## (undated) DEVICE — SUTURE VCRL + SZ 3-0 L18IN ABSRB UD SH 1/2 CIR TAPERCUT NDL VCP864D

## (undated) DEVICE — POSITIONER,HEAD,RING CUSHION,9IN,32CS: Brand: MEDLINE

## (undated) DEVICE — SUTURE MCRYL + SZ 3-0 L27IN ABSRB UD L26MM SH 1/2 CIR MCP416H

## (undated) DEVICE — HIGH PRESSURE NEPHROSTOMY BALLOON CATHETER KIT: Brand: NEPHROMAX KIT

## (undated) DEVICE — 3M™ IOBAN™ 2 ANTIMICROBIAL INCISE DRAPE 6650EZ: Brand: IOBAN™ 2